# Patient Record
Sex: MALE | Race: WHITE | NOT HISPANIC OR LATINO | Employment: FULL TIME | URBAN - METROPOLITAN AREA
[De-identification: names, ages, dates, MRNs, and addresses within clinical notes are randomized per-mention and may not be internally consistent; named-entity substitution may affect disease eponyms.]

---

## 2017-07-29 ENCOUNTER — APPOINTMENT (EMERGENCY)
Dept: RADIOLOGY | Facility: HOSPITAL | Age: 47
End: 2017-07-29
Payer: COMMERCIAL

## 2017-07-29 ENCOUNTER — HOSPITAL ENCOUNTER (EMERGENCY)
Facility: HOSPITAL | Age: 47
Discharge: HOME | End: 2017-07-29
Attending: EMERGENCY MEDICINE | Admitting: EMERGENCY MEDICINE
Payer: COMMERCIAL

## 2017-07-29 VITALS
WEIGHT: 228 LBS | HEIGHT: 68 IN | RESPIRATION RATE: 18 BRPM | OXYGEN SATURATION: 97 % | DIASTOLIC BLOOD PRESSURE: 74 MMHG | TEMPERATURE: 97.8 F | HEART RATE: 70 BPM | SYSTOLIC BLOOD PRESSURE: 125 MMHG | BODY MASS INDEX: 34.56 KG/M2
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
## 2017-07-29 DIAGNOSIS — S30.810A ABRASION OF LEFT BUTTOCK, INITIAL ENCOUNTER: ICD-10-CM

## 2017-07-29 DIAGNOSIS — S60.519A ABRASION, HAND: ICD-10-CM

## 2017-07-29 PROCEDURE — 73552 X-RAY EXAM OF FEMUR 2/>: CPT

## 2017-07-29 PROCEDURE — 73130 X-RAY EXAM OF HAND: CPT

## 2017-07-29 PROCEDURE — 99284 EMERGENCY DEPT VISIT MOD MDM: CPT

## 2017-07-29 PROCEDURE — 96372 THER/PROPH/DIAG INJ SC/IM: CPT

## 2017-07-29 PROCEDURE — 72170 X-RAY EXAM OF PELVIS: CPT

## 2017-07-29 RX ORDER — KETOROLAC TROMETHAMINE 30 MG/ML
15 INJECTION, SOLUTION INTRAMUSCULAR; INTRAVENOUS ONCE
Status: COMPLETED | OUTPATIENT
Start: 2017-07-29 | End: 2017-07-29

## 2017-07-29 RX ORDER — LISINOPRIL 10 MG/1
TABLET ORAL DAILY
COMMUNITY

## 2017-07-29 RX ADMIN — KETOROLAC TROMETHAMINE 15 MG: 30 INJECTION, SOLUTION INTRAMUSCULAR at 16:32

## 2017-12-14 ENCOUNTER — TRANSCRIBE ORDERS (OUTPATIENT)
Dept: ADMINISTRATIVE | Facility: HOSPITAL | Age: 47
End: 2017-12-14

## 2017-12-14 DIAGNOSIS — S12.501A CLOSED NONDISPLACED FRACTURE OF SIXTH CERVICAL VERTEBRA, UNSPECIFIED FRACTURE MORPHOLOGY, INITIAL ENCOUNTER (HCC): Primary | ICD-10-CM

## 2017-12-22 ENCOUNTER — HOSPITAL ENCOUNTER (OUTPATIENT)
Dept: RADIOLOGY | Facility: HOSPITAL | Age: 47
Discharge: HOME | End: 2017-12-22
Payer: COMMERCIAL

## 2017-12-22 ENCOUNTER — GENERIC CONVERSION - ENCOUNTER (OUTPATIENT)
Dept: OTHER | Facility: OTHER | Age: 47
End: 2017-12-22

## 2017-12-22 DIAGNOSIS — S12.501A CLOSED NONDISPLACED FRACTURE OF SIXTH CERVICAL VERTEBRA, UNSPECIFIED FRACTURE MORPHOLOGY, INITIAL ENCOUNTER (HCC): ICD-10-CM

## 2017-12-22 PROCEDURE — 72141 MRI NECK SPINE W/O DYE: CPT

## 2018-01-30 ENCOUNTER — TRANSCRIBE ORDERS (OUTPATIENT)
Dept: ADMINISTRATIVE | Facility: HOSPITAL | Age: 48
End: 2018-01-30

## 2018-01-30 DIAGNOSIS — S12.501A CLOSED NONDISPLACED FRACTURE OF SIXTH CERVICAL VERTEBRA, UNSPECIFIED FRACTURE MORPHOLOGY, INITIAL ENCOUNTER (HCC): Primary | ICD-10-CM

## 2018-02-06 ENCOUNTER — HOSPITAL ENCOUNTER (OUTPATIENT)
Dept: RADIOLOGY | Facility: HOSPITAL | Age: 48
Discharge: HOME | End: 2018-02-06
Payer: COMMERCIAL

## 2018-02-06 DIAGNOSIS — S12.501A CLOSED NONDISPLACED FRACTURE OF SIXTH CERVICAL VERTEBRA, UNSPECIFIED FRACTURE MORPHOLOGY, INITIAL ENCOUNTER (HCC): ICD-10-CM

## 2018-02-06 PROCEDURE — 72157 MRI CHEST SPINE W/O & W/DYE: CPT

## 2018-02-06 PROCEDURE — A9585 GADOBUTROL INJECTION: HCPCS | Performed by: RADIOLOGY

## 2018-02-06 PROCEDURE — 72142 MRI NECK SPINE W/DYE: CPT

## 2018-02-06 RX ADMIN — GADOBUTROL 10 ML: 604.72 INJECTION INTRAVENOUS at 10:38

## 2018-04-05 LAB
ANION GAP SERPL CALCULATED.3IONS-SCNC: 13.1 MM/L
APTT PPP: 28.2 SEC (ref 24.4–37.6)
BASOPHILS # BLD AUTO: 0.1 X3/UL (ref 0–0.3)
BASOPHILS # BLD AUTO: 0.9 % (ref 0–2)
BILIRUB UR QL STRIP: NEGATIVE
BUN SERPL-MCNC: 24 MG/DL (ref 7–25)
CALCIUM SERPL-MCNC: 9.5 MG/DL (ref 8.6–10.5)
CHLORIDE SERPL-SCNC: 103 MM/L (ref 98–107)
CLARITY UR: CLEAR
CO2 SERPL-SCNC: 29 MM/L (ref 21–31)
COLOR UR: YELLOW
CREAT SERPL-MCNC: 1.54 MG/DL (ref 0.7–1.3)
DEPRECATED RDW RBC AUTO: 13.2 %
EGFR (HISTORICAL): 48 GFR
EGFR AFRICAN AMERICAN (HISTORICAL): 58 GFR
EOSINOPHIL # BLD AUTO: 0.2 X3/UL (ref 0–0.5)
EOSINOPHIL NFR BLD AUTO: 3.4 % (ref 0–5)
GLUCOSE (HISTORICAL): 91 MG/DL (ref 65–99)
GLUCOSE UR STRIP-MCNC: NEGATIVE MG/DL
HCT VFR BLD AUTO: 40.3 % (ref 42–52)
HGB BLD-MCNC: 13.8 G/DL (ref 14–18)
HGB UR QL STRIP.AUTO: NEGATIVE
INR PPP: 0.97 (ref 0.9–1.5)
KETONES UR STRIP-MCNC: NEGATIVE MG/DL
LEUKOCYTE ESTERASE UR QL STRIP: NEGATIVE
LYMPHOCYTES # BLD AUTO: 1.9 X3/UL (ref 1.2–4.2)
LYMPHOCYTES NFR BLD AUTO: 33 % (ref 20.5–51.1)
MCH RBC QN AUTO: 31.4 PG (ref 26–34)
MCHC RBC AUTO-ENTMCNC: 34.2 G/DL (ref 31–37)
MCV RBC AUTO: 91.7 FL (ref 81–99)
MONOCYTES # BLD AUTO: 0.5 X3/UL (ref 0–1)
MONOCYTES NFR BLD AUTO: 9 % (ref 1.7–12)
NEUTROPHILS # BLD AUTO: 3.1 X3/UL (ref 1.4–6.5)
NEUTS SEG NFR BLD AUTO: 53.7 % (ref 42.2–75.2)
NITRITE UR QL STRIP: NEGATIVE
OSMOLALITY, SERUM (HISTORICAL): 285 MOSM (ref 262–291)
PH UR STRIP.AUTO: 6 [PH] (ref 4.5–8)
PLATELET # BLD AUTO: 215 X3/UL (ref 130–400)
PMV BLD AUTO: 7.8 FL
POTASSIUM SERPL-SCNC: 4.1 MM/L (ref 3.5–5.5)
PROT UR STRIP-MCNC: NEGATIVE MG/DL
PROTHROMBIN TIME (HISTORICAL): 11.2 SEC (ref 10.1–12.9)
RBC # BLD AUTO: 4.4 X6/UL (ref 4.3–5.9)
SODIUM SERPL-SCNC: 141 MM/L (ref 134–143)
SP GR UR STRIP.AUTO: >= 1.03 (ref 1–1.03)
UROBILINOGEN UR QL STRIP.AUTO: 0.2 EU/DL (ref 0.2–8)
WBC # BLD AUTO: 5.8 X3/UL (ref 4.8–10.8)

## 2018-06-06 ENCOUNTER — TRANSCRIBE ORDERS (OUTPATIENT)
Dept: ADMINISTRATIVE | Facility: HOSPITAL | Age: 48
End: 2018-06-06

## 2018-06-06 DIAGNOSIS — G96.198 PSEUDOMENINGOCELE: Primary | ICD-10-CM

## 2018-06-19 ENCOUNTER — HOSPITAL ENCOUNTER (OUTPATIENT)
Dept: RADIOLOGY | Facility: HOSPITAL | Age: 48
Discharge: HOME | End: 2018-06-19

## 2018-06-19 DIAGNOSIS — G96.198 PSEUDOMENINGOCELE: ICD-10-CM

## 2018-06-20 ENCOUNTER — HOSPITAL ENCOUNTER (OUTPATIENT)
Dept: RADIOLOGY | Facility: HOSPITAL | Age: 48
Discharge: HOME | End: 2018-06-20
Payer: COMMERCIAL

## 2018-06-20 ENCOUNTER — APPOINTMENT (OUTPATIENT)
Dept: PHYSICAL THERAPY | Facility: CLINIC | Age: 48
End: 2018-06-20
Payer: COMMERCIAL

## 2018-06-20 PROCEDURE — 72157 MRI CHEST SPINE W/O & W/DYE: CPT

## 2018-06-20 PROCEDURE — A9585 GADOBUTROL INJECTION: HCPCS | Performed by: INTERNAL MEDICINE

## 2018-06-20 PROCEDURE — G8985 CARRY GOAL STATUS: HCPCS | Performed by: PHYSICAL THERAPIST

## 2018-06-20 PROCEDURE — G8984 CARRY CURRENT STATUS: HCPCS | Performed by: PHYSICAL THERAPIST

## 2018-06-20 RX ADMIN — GADOBUTROL 10 ML: 604.72 INJECTION INTRAVENOUS at 15:09

## 2018-06-22 ENCOUNTER — APPOINTMENT (OUTPATIENT)
Dept: PHYSICAL THERAPY | Facility: CLINIC | Age: 48
End: 2018-06-22
Payer: COMMERCIAL

## 2018-06-25 NOTE — PROGRESS NOTES
Daily Note     Today's date: 2018  Patient name: Latisha Spear  : 1970  MRN: [id number]  Referring provider: Nelsy Foley MD  Dx:   Encounter Diagnosis     ICD-10-CM    1  Compression of spinal cord with myelopathy (HCC) G95 20    2  Cervical post-laminectomy syndrome M96 1                   Subjective: Te patient reports having increased L sided neck pain after performing the corner pectoralis stretch at home on this past Friday  The patient feels he went too deep into the stretch so he stopped doing it over the weekend  The patient c/o 3-4/10 L sided neck pain described as an "ache" today  Objective: See treatment diary below  Precautions    Specialty Daily Treatment Diary   RE EVAL:     Manual  18        Massage B/L upper trapezius 15mins                                            Exercise Diary  18       UBE stand /70 x 4mins       Nu-step   S=9 L3  X 8 mins       Abdominal Hollow        Bridges hold       Corner Pec stretch hold       Scapular retracts        Chin tucks        Quad DLS          Lower c-spine/upper t-spine stretch  (clasp hands lean fwd) 4x:20  standing       C-spine AROM all(Sit towel roll at L/B) 10x :05 ea       Levator Scapulae stretch B/L 4x :25 ea  AROM       B/L Upper Trap stretch Depress shoulder  4x :25                                                                           Modalities 18       Ultrasound L T-spine paraspinals sit  3mHz, cont, 1 8w/cm2 8 mins                            Patient received new home exercise plan handout with pictures and instructions  Emphasis placed on avoiding pain for all activities/stretches  Assessment: Tolerated treatment well  The patient reports feeling less pain/discomfort at his lower cervical and upper thoracic paraspinal muscles after the session today  The patient needs verbal cues to avoid over stretching and to exhibit proper posture    The patient will benefit from continued PT focused on mobilizing soft tissues around surgical site and strengthening his core muscles  Plan: Continue per plan of care  Will add abdominal hollowing and review new stretches next visit

## 2018-06-26 ENCOUNTER — OFFICE VISIT (OUTPATIENT)
Dept: PHYSICAL THERAPY | Facility: CLINIC | Age: 48
End: 2018-06-26
Payer: COMMERCIAL

## 2018-06-26 DIAGNOSIS — G95.20 COMPRESSION OF SPINAL CORD WITH MYELOPATHY (HCC): Primary | ICD-10-CM

## 2018-06-26 DIAGNOSIS — M96.1 CERVICAL POST-LAMINECTOMY SYNDROME: ICD-10-CM

## 2018-06-26 PROCEDURE — 97140 MANUAL THERAPY 1/> REGIONS: CPT | Performed by: PHYSICAL THERAPIST

## 2018-06-26 PROCEDURE — 97110 THERAPEUTIC EXERCISES: CPT | Performed by: PHYSICAL THERAPIST

## 2018-06-26 PROCEDURE — 97035 APP MDLTY 1+ULTRASOUND EA 15: CPT | Performed by: PHYSICAL THERAPIST

## 2018-06-28 ENCOUNTER — OFFICE VISIT (OUTPATIENT)
Dept: PHYSICAL THERAPY | Facility: CLINIC | Age: 48
End: 2018-06-28
Payer: COMMERCIAL

## 2018-06-28 DIAGNOSIS — M96.1 CERVICAL POST-LAMINECTOMY SYNDROME: ICD-10-CM

## 2018-06-28 DIAGNOSIS — G95.20 COMPRESSION OF SPINAL CORD WITH MYELOPATHY (HCC): Primary | ICD-10-CM

## 2018-06-28 PROCEDURE — 97035 APP MDLTY 1+ULTRASOUND EA 15: CPT | Performed by: PHYSICAL THERAPIST

## 2018-06-28 PROCEDURE — 97140 MANUAL THERAPY 1/> REGIONS: CPT | Performed by: PHYSICAL THERAPIST

## 2018-06-28 PROCEDURE — 97110 THERAPEUTIC EXERCISES: CPT | Performed by: PHYSICAL THERAPIST

## 2018-06-28 NOTE — PROGRESS NOTES
Daily Note     Today's date: 2018  Patient name: Jennifer Newby  : 1970  MRN: [id number]  Referring provider: Jose M Grimm MD  Dx:   Encounter Diagnosis     ICD-10-CM    1  Compression of spinal cord with myelopathy (HCC) G95 20    2  Cervical post-laminectomy syndrome M96 1                   Subjective: Patient complains of B/L neck and davian-scapular pain L>R 4/10 in intensity  The patient was painting 2 days ago which increased his neck pain requiring increased pain medicine  The patient thinks the change in position while painting is what made his pain worse  The patient admits he was not painting a large area - described as a large closet/ small hallway  Objective: See treatment diary below  RE EVAL:     Manual  18      Massage B/L upper trapezius 15mins  15 mins                                          Exercise Diary  18      UBE stand /70 x 4mins 100/70 x 6mins      Nu-step   S=9 L3  X 8 mins L3 x 10 mins      Abdominal Hollow        Bridges hold       Corner Pec stretch hold       Scapular retracts        Chin tucks        Quad DLS          Lower c-spine/upper t-spine stretch  (clasp hands lean fwd) 4x:20  standing 4x:20  Standing      C-spine AROM all(Sit towel roll at L/B) 10x :05 ea 10x 05 ea      Levator Scapulae stretch B/L 4x :25 ea  AROM 4x:25ea  AROM      B/L Upper Trap stretch Depress shoulder  4x :25 Depress shoulder  4x:25                                                                          Modalities 18      Ultrasound L T-spine paraspinals sit  3mHz, cont, 1 8w/cm2 8 mins  12 mins                          Assessment: The patient tolerated his treatment well today - notes feeling less pain after massage and ultrasound  The patient needed some verbal cues to correct posture and perform exercises properly today    The patient will benefit from continued PT to decrease neck/upper back pain and improve UE strength  Plan: Will continue focusing on ROM of C-spine/T-spine and decreasing pain to allow improved activity tolerance

## 2018-07-03 ENCOUNTER — OFFICE VISIT (OUTPATIENT)
Dept: PHYSICAL THERAPY | Facility: CLINIC | Age: 48
End: 2018-07-03
Payer: COMMERCIAL

## 2018-07-03 DIAGNOSIS — M96.1 CERVICAL POST-LAMINECTOMY SYNDROME: ICD-10-CM

## 2018-07-03 DIAGNOSIS — G95.20 COMPRESSION OF SPINAL CORD WITH MYELOPATHY (HCC): Primary | ICD-10-CM

## 2018-07-03 PROCEDURE — 97035 APP MDLTY 1+ULTRASOUND EA 15: CPT | Performed by: PHYSICAL THERAPIST

## 2018-07-03 PROCEDURE — 97140 MANUAL THERAPY 1/> REGIONS: CPT | Performed by: PHYSICAL THERAPIST

## 2018-07-03 PROCEDURE — 97110 THERAPEUTIC EXERCISES: CPT | Performed by: PHYSICAL THERAPIST

## 2018-07-03 NOTE — PROGRESS NOTES
Daily Note     Today's date: 7/3/2018  Patient name: Latisha Spear  : 1970  MRN: [id number]  Referring provider: Nelsy Foley MD  Dx:   Encounter Diagnosis     ICD-10-CM    1  Compression of spinal cord with myelopathy (HCC) G95 20    2  Cervical post-laminectomy syndrome M96 1                   Subjective: Patient states normal pain today at incision, 4/10 pain level  States pain with lifting over shoulder height  More pain in pm with stiffness in am  Most Sx located in trap region with mm tightness which will cause HA  HA approx  1x/wk         Objective: See treatment diary below  RE EVAL:      Manual  6/26/18  6/28/18  7/3/18       Massage B/L upper trapezius 15mins  15 mins  STM/stretch bilat UT, C/S PSM, LS; 1st rib mobs; scar massage; Suboccipital release; C/S manual traction x25min                                                                     Exercise Diary  6/26/18 6/28/18  7/3/18       UBE stand /70 x 4mins 100/70 x 6mins  100/70 x6 min       Nu-step   S=9 L3  X 8 mins L3 x 10 mins  L3 x10 min       Abdominal Hollow             Bridges hold           Corner Pec stretch hold           Scapular retracts             Chin tucks             Quad DLS                Lower c-spine/upper t-spine stretch  (clasp hands lean fwd) 4x:20  standing 4x:20  Standing  4x:20 standing       C-spine AROM all(Sit towel roll at L/B) 10x :05 ea 10x 05 ea  10x:05 each       Levator Scapulae stretch B/L 4x :25 ea  AROM 4x:25ea  AROM  4x:25 AROM       B/L Upper Trap stretch Depress shoulder  4x :25 Depress shoulder  4x:25  Depress shoulder 4x:25       L 1st rib mob with strap     10x                                                                                                                Modalities 6/26/18 6/28/18  7/3/18       Ultrasound L T-spine paraspinals sit  3mHz, cont, 1 8w/cm2 8 mins  12 mins  12 min                                         Assessment: Noted mod limited L 1st rib mobility with UT tightness as well as tightness of suboccipital mm possibly contributing to L superior shoulder pain and tension HA Sx  Patient was given self 1st rib mob for home to help improve rib mobility  Plan: Continue per plan of care

## 2018-07-05 ENCOUNTER — OFFICE VISIT (OUTPATIENT)
Dept: PHYSICAL THERAPY | Facility: CLINIC | Age: 48
End: 2018-07-05
Payer: COMMERCIAL

## 2018-07-05 DIAGNOSIS — M96.1 CERVICAL POST-LAMINECTOMY SYNDROME: ICD-10-CM

## 2018-07-05 DIAGNOSIS — G95.20 COMPRESSION OF SPINAL CORD WITH MYELOPATHY (HCC): Primary | ICD-10-CM

## 2018-07-05 PROCEDURE — 97110 THERAPEUTIC EXERCISES: CPT | Performed by: PHYSICAL THERAPIST

## 2018-07-05 PROCEDURE — 97140 MANUAL THERAPY 1/> REGIONS: CPT | Performed by: PHYSICAL THERAPIST

## 2018-07-05 PROCEDURE — 97035 APP MDLTY 1+ULTRASOUND EA 15: CPT | Performed by: PHYSICAL THERAPIST

## 2018-07-05 NOTE — PROGRESS NOTES
Daily Note     Today's date: 2018  Patient name: Leonid Mon  : 1970  MRN: [id number]  Referring provider: Kailee Castellanos MD  Dx:   Encounter Diagnosis     ICD-10-CM    1  Compression of spinal cord with myelopathy (HCC) G95 20    2  Cervical post-laminectomy syndrome M96 1                   Subjective: The patient complains of 3/10 mid/upper back pain  Patient reports home exercises are " going ok" at this time  Patient notes bending over to  sheets from the floor increased his neck pain, "it felt like a stroke in my neck and throat "  When asked to elaborate the patient reports it was mainly a feeling of pain at this area - it did resolve on it's own  The patient notes this does happen whenever he is bending forward to the floor/reaching        Objective: See treatment diary below  RE EVAL:      Manual  6/26/18  6/28/18  7/3/18  7/5/18     Massage B/L upper trapezius 15mins  15 mins  STM/stretch bilat UT, C/S PSM, LS; 1st rib mobs; scar massage; Suboccipital release; C/S manual traction x25min  STM/scar massage    15 mins                                                                   Exercise Diary  6/26/18 6/28/18  7/3/18  7/5/18     UBE stand /70 x 4mins 100/70 x 6mins  100/70 x6 min  100/70 x6mins     Nu-step   S=9 L3  X 8 mins L3 x 10 mins  L3 x10 min  L4 x10 mins     Abdominal Hollow          *   Bridges hold           Corner Pec stretch hold           Scapular retracts          *   Chin tucks         *    Quad DLS             *   Lower c-spine/upper  t-spine stretch  (clasp hands lean fwd) 4x:20  standing 4x:20  Standing  4x:20 standing  4x:20     C-spine AROM all(Sit towel roll at L/B) 10x :05 ea 10x 05 ea  10x:05 each  10x :05ea  home ex   Levator Scapulae stretch B/L 4x :25 ea  AROM 4x:25ea  AROM  4x:25 AROM  4x :25 over pressure  home ex   B/L Upper Trap stretch Depress shoulder  4x :25 Depress shoulder  4x:25  Depress shoulder 4x:25  4x:25  Arom  home ex   L 1st rib mob with strap     10x                      SCM stretch B/L           *    Scalene Stretch B/L          *                                                                 Modalities 6/26/18 6/28/18  7/3/18  7/5/18     Ultrasound L T-spine paraspinals sit  3mHz, cont, 1 8w/cm2 8 mins  12 mins  12 min  12 min                                     Assessment: The patient had tightness at his left sternocleidomastoid/scalene muscle group during soft tissue massage today  The patient's exercise technique/posture is improving each week  The patient required less cues to exercise properly  The patient will benefit from starting some stabilization exercises next week as long as his pain levels remain below a 5/10 level and he demonstrates no worsening of neurologic symptoms  Patient will benefit from continued skilled PT services at this time  Plan: The patient will benefit from the introduction of postural strengthening/ dynamic stabilization exercises next week

## 2018-07-09 NOTE — PROGRESS NOTES
Daily Note     Today's date: 7/10/18  Patient name: Leonid Mon  : 1970  MRN: [id number]  Referring provider: Kailee Castellanos MD  Dx:   Encounter Diagnosis     ICD-10-CM    1  Compression of spinal cord with myelopathy (HCC) G95 20    2  Cervical post-laminectomy syndrome M96 1                   Subjective: Patient complained of 4/10 pain at upper cervical spine causing a cervical headache  The patient reports having increased L sided neck pain after last session which lasted 18-18  The patient feels the increased time spent massaging at his L SCM/scalene area aggravated his pain  Patient does admit the massage felt good at the time but it was later that night that he experienced increased pain          Objective: See treatment diary below  RE EVAL:      Manual  6/26/18  6/28/18  7/3/18  7/5/18  7/10/18   Massage B/L upper trapezius 15mins  15 mins  STM/stretch bilat UT, C/S PSM, LS; 1st rib mobs; scar massage; Suboccipital release; C/S manual traction x25min  STM/scar massage    15 mins  STM/scar massage   B/L UT/ scapular muscles  10 mins   Gentle manual traction c-spine and sub occipital release          5 mins                                                   Exercise Diary  6/26/18 6/28/18  7/3/18  7/5/18  7/10/18   UBE stand /70 x 4mins 100/70 x 6mins  100/70 x6 min  100/70 x6mins  90/70  X 8mins   Nu-step   S=9 L3  X 8 mins L3 x 10 mins  L3 x10 min  L4 x10 mins  hold   Abdominal Hollow          x 10 stand   Bridges hold           Corner Pec stretch hold           Scapular retracts          * x10 :05   Chin tucks         * x10   Quad DLS                Lower c-spine/upper  t-spine stretch  (clasp hands lean fwd) 4x:20  standing 4x:20  Standing  4x:20 standing  4x:20  4x :20 stand   C-spine AROM all(Sit towel roll at L/B) 10x :05 ea 10x 05 ea  10x:05 each  10x :05ea  home ex   Levator Scapulae stretch B/L 4x :25 ea  AROM 4x:25ea  AROM  4x:25 AROM  4x :25 over pressure  home ex B/L Upper Trap stretch Depress shoulder  4x :25 Depress shoulder  4x:25  Depress shoulder 4x:25  4x:25  Arom  home ex   L 1st rib mob with strap     10x                      SCM stretch B/L           *1x:30 D/C    Scalene Stretch B/L          *3x :30ea                                                                 Modalities 6/26/18 6/28/18  7/3/18  7/5/18  7/10/18   Ultrasound L T-spine paraspinals sit  3mHz, cont, 1 8w/cm2 8 mins  12 mins  12 min  12 min  12 mins                               The patient was given a new home exercise plan with written handout, pictures, and verbal instruction  The patient accepts and understands the new home activities  Assessment: The patient tolerated treatment well today  He reported a decrease in sensitivity around his surgical incision after manual therapy and ultrasound procedures  The patient ended the session feeling good without complaints of pain at his neck  The patient still has difficulty and pain with quick movements, coughing, and jarring steps if stepping off of a curb  The patient will benefit from continued PT to strengthen his core, mobilize his c-spine/t-spine, and allow a return to PLOF  Plan: Will continue to add core strengthening activities within tolerance to pain

## 2018-07-10 ENCOUNTER — OFFICE VISIT (OUTPATIENT)
Dept: PHYSICAL THERAPY | Facility: CLINIC | Age: 48
End: 2018-07-10
Payer: COMMERCIAL

## 2018-07-10 DIAGNOSIS — M96.1 CERVICAL POST-LAMINECTOMY SYNDROME: ICD-10-CM

## 2018-07-10 DIAGNOSIS — G95.20 COMPRESSION OF SPINAL CORD WITH MYELOPATHY (HCC): Primary | ICD-10-CM

## 2018-07-10 PROCEDURE — 97110 THERAPEUTIC EXERCISES: CPT | Performed by: PHYSICAL THERAPIST

## 2018-07-10 PROCEDURE — 97035 APP MDLTY 1+ULTRASOUND EA 15: CPT | Performed by: PHYSICAL THERAPIST

## 2018-07-10 PROCEDURE — 97140 MANUAL THERAPY 1/> REGIONS: CPT | Performed by: PHYSICAL THERAPIST

## 2018-07-12 ENCOUNTER — APPOINTMENT (OUTPATIENT)
Dept: PHYSICAL THERAPY | Facility: CLINIC | Age: 48
End: 2018-07-12
Payer: COMMERCIAL

## 2018-07-12 NOTE — PROGRESS NOTES
Daily Note     Today's date: 2018  Patient name: Janee Gamboa  : 1970  MRN: [id number]  Referring provider: Meredith Macias MD  Dx: No diagnosis found  Subjective: ***      Objective: See treatment diary below  RE EVAL:      Manual       7/10/18   Massage B/L upper trapezius      STM/scar massage   B/L UT/ scapular muscles  10 mins   Gentle manual traction c-spine and sub occipital release      5 mins                                       Exercise Diary       7/10/18   UBE stand ALT      90/70  X 8mins   Nu-step   S=9      hold   Abdominal Hollow      x 10 stand   Bridges         Corner Pec stretch         Scapular retracts      * x10 :05   Chin tucks     * x10   Quad DLS            Lower c-spine/upper  t-spine stretch  (clasp hands lean fwd)      4x :20 stand   C-spine AROM all(Sit towel roll at L/B)      home ex   Levator Scapulae stretch B/L      home ex   B/L Upper Trap stretch      home ex   L 1st rib mob with strap                   SCM stretch B/L       *1x:30 D/C    Scalene Stretch B/L      *3x :30ea                                                 Modalities      7/10/18   Ultrasound L T-spine paraspinals sit  3mHz, cont, 1 8w/cm2      12 mins                           Assessment: Tolerated treatment {Tolerated treatment :}   Patient {assessment:}      Plan: {PLAN:[id number]}

## 2018-07-13 ENCOUNTER — OFFICE VISIT (OUTPATIENT)
Dept: PHYSICAL THERAPY | Facility: CLINIC | Age: 48
End: 2018-07-13
Payer: COMMERCIAL

## 2018-07-13 DIAGNOSIS — M96.1 CERVICAL POST-LAMINECTOMY SYNDROME: ICD-10-CM

## 2018-07-13 DIAGNOSIS — G95.20 COMPRESSION OF SPINAL CORD WITH MYELOPATHY (HCC): Primary | ICD-10-CM

## 2018-07-13 PROCEDURE — 97110 THERAPEUTIC EXERCISES: CPT | Performed by: PHYSICAL THERAPIST

## 2018-07-13 PROCEDURE — 97140 MANUAL THERAPY 1/> REGIONS: CPT | Performed by: PHYSICAL THERAPIST

## 2018-07-13 PROCEDURE — 97035 APP MDLTY 1+ULTRASOUND EA 15: CPT | Performed by: PHYSICAL THERAPIST

## 2018-07-13 NOTE — PROGRESS NOTES
Daily Note     Today's date: 2018  Patient name: Lavanda Dance  : 1970  MRN: [id number]  Referring provider: Tee Becker MD  Dx:   Encounter Diagnosis     ICD-10-CM    1  Compression of spinal cord with myelopathy (HCC) G95 20    2  Cervical post-laminectomy syndrome M96 1        Start Time: 1000          Subjective:  Patient reports feeling pretty good today  "The worst is trying to get out of bed in the morning "  The patient does not have a rating of pain presently        Objective: See treatment diary below  RE EVAL:      Manual  18       Massage B/L upper trapezius STM/Scar massage  x10 mins       Gentle manual traction c-spine and sub occipital release 3 mins                                        Exercise Diary  18       UBE stand ALT 90/70  x8 mins       Nu-step   S=9 L4 [id number]       Abdominal Hollow x10       Bridges        Corner Pec stretch        Scapular retracts x15       Chin tucks x15       Quad DLS           Lower c-spine/upper  t-spine stretch  (clasp hands lean fwd) 4x:20 stand                                                           Scalene Stretch B/L 3x:30 ea                                                 Modalities 18       Ultrasound L T-spine paraspinals sit  3mHz, cont, 1 8w/cm2 X 12 mins                             Assessment: Tolerated treatment well today  The patient did not have any pain after session  The patient needed cues to perform new stretches and exercises properly  The patient will benefit from core strengthening exercises once pain is consistently well controled  Plan: Will re-evaluate next visit and change plan of care as needed

## 2018-07-17 ENCOUNTER — EVALUATION (OUTPATIENT)
Dept: PHYSICAL THERAPY | Facility: CLINIC | Age: 48
End: 2018-07-17
Payer: COMMERCIAL

## 2018-07-17 DIAGNOSIS — G95.20 COMPRESSION OF SPINAL CORD WITH MYELOPATHY (HCC): Primary | ICD-10-CM

## 2018-07-17 DIAGNOSIS — M96.1 CERVICAL POST-LAMINECTOMY SYNDROME: ICD-10-CM

## 2018-07-17 PROCEDURE — 97110 THERAPEUTIC EXERCISES: CPT | Performed by: PHYSICAL THERAPIST

## 2018-07-17 PROCEDURE — G8985 CARRY GOAL STATUS: HCPCS | Performed by: PHYSICAL THERAPIST

## 2018-07-17 PROCEDURE — 97035 APP MDLTY 1+ULTRASOUND EA 15: CPT | Performed by: PHYSICAL THERAPIST

## 2018-07-17 PROCEDURE — G8984 CARRY CURRENT STATUS: HCPCS | Performed by: PHYSICAL THERAPIST

## 2018-07-17 PROCEDURE — 97140 MANUAL THERAPY 1/> REGIONS: CPT | Performed by: PHYSICAL THERAPIST

## 2018-07-17 NOTE — PROGRESS NOTES
PT Re-Evaluation     Today's date: 2018  Patient name: Jacinta Peralta  : 1970  MRN: [id number]  Referring provider: Ambar Bettencourt MD   Dx:   Encounter Diagnosis     ICD-10-CM    1  Compression of spinal cord with myelopathy (HCC) G95 20    2  Cervical post-laminectomy syndrome M96 1                   Assessment  Impairments: abnormal or restricted ROM, abnormal movement, activity intolerance, impaired physical strength, lacks appropriate home exercise program, pain with function, poor posture  and poor body mechanics  Functional limitations: Patient is having difficulty bending forward to tie his shoes, unable to work(swinging hammer, lifting/carrying heavy items), maintaining static standing/sitting positions greater than 30 mins  Assessment details: Jacinta Peralta is a 50 y o  male that presents with complaints of pain and weakness related to 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  The patient has made functional gains since starting therapy  The patient is now able to ride a bicycle on level ground for 30 minutes prior to feeling neck pain  The patient reports sleeping a little better at night with less discomfort  The patient is not as hypersensitive at his L scapular area compared to when he first started therapy  The patient continues to have difficulty with reaching over head, maintaining static standing and sitting positions, forward flexing to tie shoes and bend down, and performing work related activities with heavy lifting and ballistic movements   The patient will benefit from continued skilled PT to address impairments, work towards goals, and restore patient PLOF  Understanding of Dx/Px/POC: good   Prognosis: good    Goals  STG: Achieve in 4 weeks  1  C-spine and upper T-spine @ worst 3-4/10 to improve static sitting/standing tolerance  PARTIALLY ACHIEVED (pain is 5/10 @ worst)  2  C-spine extension ROM: 60 degrees to improve looking over head  PARTIALLY ACHIEVED (now 50 degrees)  3  Strength R handheld dynamometry : 90 pounds;  R hip flexion,Quads, Hams, Tibialis Antetrior > 4/5 to improve standing tolerance  PARTIAL (R MMT is 4/5 to 4-/5)  LTG: Achieve in 6-8 weeks  1  Neck Disability Index:  Less than 30% disability  PARTIAL( now improved to 50% disability)  2  Patient tolerate sitting/standing > 1 hour without pain more than 3/10  PARTIAL (tolerating 30 mins without pain)  3  Patient sleep with less than 2 hours disturbance  PARTIAL ( 2-3 hours sleepless on NDI)  4  Patient to be independent with home exercise plan at time of discharge        Plan  Planned modality interventions: TENS, traction, ultrasound and unattended electrical stimulation  Planned therapy interventions: manual therapy, massage, neuromuscular re-education, patient education, postural training, strengthening, stretching, self care, therapeutic exercise, home exercise program, graded exercise, functional ROM exercises, flexibility and body mechanics training  Frequency: 2x week  Duration in visits: 8  Duration in weeks: 4  Plan of Care beginning date: 2018  Plan of Care expiration date: 2018  Treatment plan discussed with: patient        Subjective Evaluation    History of Present Illness  Date of onset: 2017  Date of surgery: 2018  Mechanism of injury: trauma  Mechanism of injury: Please see previous initial evaluation for mechanism of injury  Quality of life: fair    Pain  Current pain rating: 3  At best pain ratin  At worst pain ratin  Location: left upper trapezius, left lateral c-spine paraspinals  Quality: radiating and pressure  Aggravating factors: overhead activity  Progression: improved      Diagnostic Tests  MRI studies: normal (post op MRI of T-spine revelead no cord compression)  Treatments  Current treatment: physical therapy  Patient Goals  Patient goals for therapy: decreased pain, increased strength, return to sport/leisure activities, return to work and increased motion          Objective     Postural Observations    Additional Postural Observation Details  Patient has forward head posture    Palpation   Left   Tenderness of the sternocleidomastoid and upper trapezius  Left Shoulder Comments  Left upper trapezius: at the base of c-spine  Neurological Testing     Additional Neurological Details  L scapula hypersensitivity to light touch improved compared to initial evaluation  Patient c/o R foot numbness (unchanged)      Active Range of Motion   Cervical/Thoracic Spine   Cervical    Extension: 50 degrees   Left lateral flexion: 40 degrees   Right lateral flexion: 38 degrees     Strength/Myotome Testing     Left Hip   Planes of Motion   Flexion: 4+    Right Hip   Planes of Motion   Flexion: 5    Left Knee   Flexion: 4-  Extension: 4-    Right Knee   Flexion: 5  Extension: 4+    Left Ankle/Foot   Dorsiflexion: 4    Right Ankle/Foot   Dorsiflexion: 5    Additional Strength Details  Hand Held Dynamometry : R: 75 POUNDS  L:  85 POUNDS       Flowsheet Rows      Most Recent Value   PT/OT G-Codes   Current Score  neck disability index : 50% disability   FOTO information reviewed  No   Assessment Type  Re-evaluation   G code set  Carrying, Moving & Handling Objects   Carrying, Moving and Handling Objects Current Status ()  CK   Carrying, Moving and Handling Objects Goal Status ()  CI          Precautions: Avoid extreme end range of motion and lifting > 25 pounds  RE EVAL: 7/18     Manual  7/13/18 7/17/18      Massage B/L upper trapezius STM/Scar massage  x10 mins STM/scar massage  x10 mins      Gentle manual traction c-spine and sub occipital release 3 mins 3 mins                                       Exercise Diary  7/13/18 7/17/18      UBE stand ALT 90/70  x8 mins 90/70  x9 mins      Nu-step   S=9 L4 p21moph L4 x 12 mins      Abdominal Hollow x10 x20      Bridges        Corner Pec stretch  4x:30      Scapular retracts x15 Lt blue x 20       Chin tucks x15 x20      Quad DLS     UE: E14  LE: x10  UE+LE:      Lower c-spine/upper  t-spine stretch  (clasp hands lean fwd) 4x:20 stand 4x:20                                                          Scalene Stretch B/L 3x:30 ea                                                 Modalities 7/13/18 7/17/18      Ultrasound L T-spine paraspinals sit  3mHz, cont, 1 8w/cm2 X 12 mins x12 mins

## 2018-07-17 NOTE — LETTER
2018    Dimple Dial MD  [facility]  [address]    Patient: Tye Lutz   YOB: 1970   Date of Visit: 2018     Encounter Diagnosis     ICD-10-CM    1  Compression of spinal cord with myelopathy (HCC) G95 20    2  Cervical post-laminectomy syndrome M96 1        Dear Dr Maribell Olivia Recipients:    Please review the attached Plan of Care from Sarah Ralph recent visit  Please verify that you agree therapy should continue by signing the attached document and sending it back to our office  If you have any questions or concerns, please don't hesitate to call  Sincerely,    Heraclio Burrows, PT      Referring Provider:      I certify that I have read the below Plan of Care and certify the need for these services furnished under this plan of treatment while under my care  MD Luis Alfredo [facility] [id number]  VIA Mail          PT Re-Evaluation     Today's date: 2018  Patient name: Tye Lutz  : 1970  MRN: [id number]  Referring provider: Anne Moreira MD   Dx:   Encounter Diagnosis     ICD-10-CM    1  Compression of spinal cord with myelopathy (HCC) G95 20    2  Cervical post-laminectomy syndrome M96 1                   Assessment  Impairments: abnormal or restricted ROM, abnormal movement, activity intolerance, impaired physical strength, lacks appropriate home exercise program, pain with function, poor posture  and poor body mechanics  Functional limitations: Patient is having difficulty bending forward to tie his shoes, unable to work(swinging hammer, lifting/carrying heavy items), maintaining static standing/sitting positions greater than 30 mins  Assessment details: Tye Lutz is a 50 y o  male that presents with complaints of pain and weakness related to 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  The patient has made functional gains since starting therapy    The patient is now able to ride a bicycle on level ground for 30 minutes prior to feeling neck pain  The patient reports sleeping a little better at night with less discomfort  The patient is not as hypersensitive at his L scapular area compared to when he first started therapy  The patient continues to have difficulty with reaching over head, maintaining static standing and sitting positions, forward flexing to tie shoes and bend down, and performing work related activities with heavy lifting and ballistic movements   The patient will benefit from continued skilled PT to address impairments, work towards goals, and restore patient PLOF  Understanding of Dx/Px/POC: good   Prognosis: good    Goals  STG: Achieve in 4 weeks  1  C-spine and upper T-spine @ worst 3-4/10 to improve static sitting/standing tolerance  PARTIALLY ACHIEVED (pain is 5/10 @ worst)  2  C-spine extension ROM: 60 degrees to improve looking over head  PARTIALLY ACHIEVED (now 50 degrees)  3  Strength R handheld dynamometry : 90 pounds;  R hip flexion,Quads, Hams, Tibialis Antetrior > 4/5 to improve standing tolerance  PARTIAL (R MMT is 4/5 to 4-/5)  LTG: Achieve in 6-8 weeks  1  Neck Disability Index:  Less than 30% disability  PARTIAL( now improved to 50% disability)  2  Patient tolerate sitting/standing > 1 hour without pain more than 3/10  PARTIAL (tolerating 30 mins without pain)  3  Patient sleep with less than 2 hours disturbance  PARTIAL ( 2-3 hours sleepless on NDI)  4  Patient to be independent with home exercise plan at time of discharge        Plan  Planned modality interventions: TENS, traction, ultrasound and unattended electrical stimulation  Planned therapy interventions: manual therapy, massage, neuromuscular re-education, patient education, postural training, strengthening, stretching, self care, therapeutic exercise, home exercise program, graded exercise, functional ROM exercises, flexibility and body mechanics training  Frequency: 2x week  Duration in visits: 8  Duration in weeks: 4  Plan of Care beginning date: 2018  Plan of Care expiration date: 2018  Treatment plan discussed with: patient        Subjective Evaluation    History of Present Illness  Date of onset: 2017  Date of surgery: 2018  Mechanism of injury: trauma  Mechanism of injury: Please see previous initial evaluation for mechanism of injury  Quality of life: fair    Pain  Current pain rating: 3  At best pain ratin  At worst pain ratin  Location: left upper trapezius, left lateral c-spine paraspinals  Quality: radiating and pressure  Aggravating factors: overhead activity  Progression: improved      Diagnostic Tests  MRI studies: normal (post op MRI of T-spine revelead no cord compression)  Treatments  Current treatment: physical therapy  Patient Goals  Patient goals for therapy: decreased pain, increased strength, return to sport/leisure activities, return to work and increased motion          Objective     Postural Observations    Additional Postural Observation Details  Patient has forward head posture    Palpation   Left   Tenderness of the sternocleidomastoid and upper trapezius  Left Shoulder Comments  Left upper trapezius: at the base of c-spine  Neurological Testing     Additional Neurological Details  L scapula hypersensitivity to light touch improved compared to initial evaluation  Patient c/o R foot numbness (unchanged)      Active Range of Motion   Cervical/Thoracic Spine   Cervical    Extension: 50 degrees   Left lateral flexion: 40 degrees   Right lateral flexion: 38 degrees     Strength/Myotome Testing     Left Hip   Planes of Motion   Flexion: 4+    Right Hip   Planes of Motion   Flexion: 5    Left Knee   Flexion: 4-  Extension: 4-    Right Knee   Flexion: 5  Extension: 4+    Left Ankle/Foot   Dorsiflexion: 4    Right Ankle/Foot   Dorsiflexion: 5    Additional Strength Details  Hand Held Dynamometry : R: 75 POUNDS  L:  85 POUNDS       Flowsheet Rows      Most Recent Value   PT/OT G-Codes   Current Score  neck disability index : 50% disability   FOTO information reviewed  No   Assessment Type  Re-evaluation   G code set  Carrying, Moving & Handling Objects   Carrying, Moving and Handling Objects Current Status ()  CK   Carrying, Moving and Handling Objects Goal Status ()  CI          Precautions: Avoid extreme end range of motion and lifting > 25 pounds  RE EVAL: 7/18     Manual  7/13/18 7/17/18      Massage B/L upper trapezius STM/Scar massage  x10 mins STM/scar massage  x10 mins      Gentle manual traction c-spine and sub occipital release 3 mins 3 mins                                       Exercise Diary  7/13/18 7/17/18      UBE stand ALT 90/70  x8 mins 90/70  x9 mins      Nu-step   S=9 L4 [id number] L4 x 12 mins      Abdominal Hollow x10 x20      Bridges        Corner Pec stretch  4x:30      Scapular retracts x15 Lt blue x 20       Chin tucks x15 x20      Quad DLS     UE: V79  LE: x10  UE+LE:      Lower c-spine/upper  t-spine stretch  (clasp hands lean fwd) 4x:20 stand 4x:20                                                          Scalene Stretch B/L 3x:30 ea                                                 Modalities 7/13/18 7/17/18      Ultrasound L T-spine paraspinals sit  3mHz, cont, 1 8w/cm2 X 12 mins x12 mins

## 2018-07-19 ENCOUNTER — OFFICE VISIT (OUTPATIENT)
Dept: PHYSICAL THERAPY | Facility: CLINIC | Age: 48
End: 2018-07-19
Payer: COMMERCIAL

## 2018-07-19 DIAGNOSIS — G95.20 COMPRESSION OF SPINAL CORD WITH MYELOPATHY (HCC): Primary | ICD-10-CM

## 2018-07-19 DIAGNOSIS — M96.1 CERVICAL POST-LAMINECTOMY SYNDROME: ICD-10-CM

## 2018-07-19 PROCEDURE — 97035 APP MDLTY 1+ULTRASOUND EA 15: CPT | Performed by: PHYSICAL THERAPIST

## 2018-07-19 PROCEDURE — 97110 THERAPEUTIC EXERCISES: CPT | Performed by: PHYSICAL THERAPIST

## 2018-07-19 PROCEDURE — 97140 MANUAL THERAPY 1/> REGIONS: CPT | Performed by: PHYSICAL THERAPIST

## 2018-07-19 NOTE — PROGRESS NOTES
Daily Note     Today's date: 2018  Patient name: Heather Burk  : 1970  MRN: [id number]  Referring provider: Itzel Miramontes MD  Dx:   Encounter Diagnosis     ICD-10-CM    1  Compression of spinal cord with myelopathy (HCC) G95 20    2  Cervical post-laminectomy syndrome M96 1                   Subjective: The patient reports nagging pain 3/10 at B/L upper traps and scapular area  The patient reports it feels like a "sunburn" at times  Objective: See treatment diary below  Precautions: Avoid extreme end range of motion and lifting > 25 pounds  RE EVAL: 18     Manual  18     Massage B/L upper trapezius STM/Scar massage  x10 mins STM/scar massage  x10 mins STM/scar massage  x10 mins     Gentle manual traction c-spine and sub occipital release 3 mins 3 mins 3 mins                                      Exercise Diary  18     UBE stand ALT 90/70  x8 mins 90/70  x9 mins 90/70  x10     Nu-step   S=9 L4 [id number] L4 x 12 mins  Discharge     Abdominal Hollow x10 x20      Bridges   x20     Corner Pec stretch  4x:30 4x:25     Scapular retracts x15 Lt blue x 20  GRN x20     Chin tucks x15 x20      Quad DLS     UE: R44  LE: x10  UE+LE: UE: x15  LE: x15  UE+LE: x15     Lower c-spine/upper  t-spine stretch  (clasp hands lean fwd) 4x:20 stand 4x:20 4x:20             T-band lift/chop in standing B/L   Lt Blue x15 each                                         Scalene Stretch B/L 3x:30 ea  3x :30                                               Modalities 18     Ultrasound L T-spine paraspinals sit  3mHz, cont, 1 8w/cm2 X 12 mins x12 mins x12 mins                           Assessment:  The patient tolerated all activities well today without increase in pain  The patient started some new UE/core strengthening activities today without increasing pain at head/neck  The patient will benefit from continued PT to achieve his functional goals        Plan: Continue per plan of care  Progress treatment as tolerated

## 2018-07-24 ENCOUNTER — OFFICE VISIT (OUTPATIENT)
Dept: PHYSICAL THERAPY | Facility: CLINIC | Age: 48
End: 2018-07-24
Payer: COMMERCIAL

## 2018-07-24 DIAGNOSIS — G95.20 COMPRESSION OF SPINAL CORD WITH MYELOPATHY (HCC): Primary | ICD-10-CM

## 2018-07-24 DIAGNOSIS — M96.1 CERVICAL POST-LAMINECTOMY SYNDROME: ICD-10-CM

## 2018-07-24 PROCEDURE — 97140 MANUAL THERAPY 1/> REGIONS: CPT | Performed by: PHYSICAL THERAPIST

## 2018-07-24 PROCEDURE — 97110 THERAPEUTIC EXERCISES: CPT | Performed by: PHYSICAL THERAPIST

## 2018-07-24 NOTE — PROGRESS NOTES
Daily Note     Today's date: 2018  Patient name: Jason Perry  : 1970  MRN: [id number]  Referring provider: Hazel De La Rosa MD  Dx:   Encounter Diagnosis     ICD-10-CM    1  Compression of spinal cord with myelopathy (HCC) G95 20    2  Cervical post-laminectomy syndrome M96 1                   Subjective: The patient complains of 4/10 pain at B/L buttocks, hamstrings, and calves today from going up/down the steps multiple times over the weekend  The patient is also having pain at his B/L upper trapezius muscles rated a 4/10        Objective: See treatment diary below  Precautions: Avoid extreme end range of motion and lifting > 25 pounds  RE EVAL: 18     Manual  18    Massage B/L upper trapezius STM/Scar massage  x10 mins STM/scar massage  x10 mins STM/scar massage  x10 mins STM  x10min    Gentle manual traction c-spine and sub occipital release 3 mins 3 mins 3 mins x5 mins                                     Exercise Diary  18    UBE stand ALT 90/70  x8 mins 90/70  x9 mins 90/70  x10 80/70  X 10 mins    Nu-step   S=9 L4 [id number] L4 x 12 mins  Discharge     Abdominal Hollow x10 x20  Home ex    Bridges   x20 x20    Corner Pec stretch  4x:30 4x:25 4x:30    Scapular retracts x15 Lt blue x 20  GRN x20 GRN x30    Chin tucks x15 x20  Home ex    Quad DLS     UE: W90  LE: x10  UE+LE: UE: x15  LE: x15  UE+LE: x15 UE+LE  x20 ea    Lower c-spine/upper  t-spine stretch  (clasp hands lean fwd) 4x:20 stand 4x:20 4x:20 4x:20            T-band lift/chop in standing B/L   Lt Blue x15(lift) each  Lt Blue x20  Each(lift)  GRN (chop)            Fitter Board Balance    Fwd/side  x20                        Scalene Stretch B/L 3x:30 ea  3x :30                                               Modalities 7/13/18 7/17/18 7/19/18 7/24/18    Ultrasound L T-spine paraspinals sit  3mHz, cont, 1 8w/cm2 X 12 mins x12 mins x12 mins D/C                       The patient was given a new home exercise plan with written handout, pictures, and verbal instruction  The patient accepts and understands the new home activities  Assessment: Tolerated treatment well and the patient was able to tolerate new balance activities today    Patient demonstrated fatigue post treatment, exhibited good technique with therapeutic exercises and would benefit from continued PT  Ultrasound treatment was discharged this session  Plan: Continue per plan of care  Progress treatment as tolerated

## 2018-07-26 ENCOUNTER — OFFICE VISIT (OUTPATIENT)
Dept: PHYSICAL THERAPY | Facility: CLINIC | Age: 48
End: 2018-07-26
Payer: COMMERCIAL

## 2018-07-26 DIAGNOSIS — G95.20 COMPRESSION OF SPINAL CORD WITH MYELOPATHY (HCC): Primary | ICD-10-CM

## 2018-07-26 DIAGNOSIS — M96.1 CERVICAL POST-LAMINECTOMY SYNDROME: ICD-10-CM

## 2018-07-26 PROCEDURE — 97140 MANUAL THERAPY 1/> REGIONS: CPT | Performed by: PHYSICAL THERAPIST

## 2018-07-26 PROCEDURE — 97110 THERAPEUTIC EXERCISES: CPT | Performed by: PHYSICAL THERAPIST

## 2018-07-26 NOTE — PROGRESS NOTES
Daily Note     Today's date: 2018  Patient name: Amy Argueta  : 1970  MRN: [id number]  Referring provider: Crescencio Bishop MD  Dx:   Encounter Diagnosis     ICD-10-CM    1  Compression of spinal cord with myelopathy (HCC) G95 20    2  Cervical post-laminectomy syndrome M96 1                   Subjective: Patient c/o 3/10 pain at his c-spine and upper trapezius muscles left> right at this time  The patient reports bumping his head yesterday which caused an increase in pain to 5/10 at his posterior head and neck  The patient has been avoiding activities requiring prolonged forward bending ie) bending over to tie his shoes because he finds these activities irritate his pain          Objective: See treatment diary below  Precautions: Avoid extreme end range of motion and lifting > 25 pounds  RE EVAL: 18     Manual  18   Massage B/L upper trapezius STM/Scar massage  x10 mins STM/scar massage  x10 mins STM/scar massage  x10 mins STM  x10min STM  x8 mins   Gentle manual traction c-spine and sub occipital release 3 mins 3 mins 3 mins x5 mins X 7mins                                    Exercise Diary  18   UBE stand ALT 90/70  x8 mins 90/70  x9 mins 90/70  x10 80/70  X 10 mins 80/70  X 10 mins   Nu-step   S=9 L4 [id number] L4 x 12 mins  Discharge     Abdominal Hollow x10 x20  Home ex    Bridges   x20 x20 X 30   Corner Pec stretch  4x:30 4x:25 4x:30 4x:30   Scapular retracts x15 Lt blue x 20  GRN x20 GRN x30 Home ex   Chin tucks x15 x20  Home ex    Quad DLS     UE: W86  LE: x10  UE+LE: UE: x15  LE: x15  UE+LE: x15 UE+LE  x20 ea UE+LE  Alt  x20 each   Lower c-spine/upper  t-spine stretch  (clasp hands lean fwd) 4x:20 stand 4x:20 4x:20 4x:20 4x:20           T-band lift/chop in standing B/L   Lt Blue x15(lift) each  Lt Blue x20  Each(lift)  GRN (chop) Lt blue  x20  Green(chop)  x20           Fitter Board Balance    Fwd/side  x20 Fwd/side  x25    Bosu mini squats          Bosu step ups (r lead)         Scalene Stretch B/L 3x:30 ea  3x :30 nt     Hoist chest press     2 plates  3E90    Hoist seated rows     2 plates  0P69    hoist lat pull downs     2plates  7D58                  Modalities 7/13/18 7/17/18 7/19/18 7/24/18 7/26/18   Ultrasound L T-spine paraspinals sit  3mHz, cont, 1 8w/cm2 X 12 mins x12 mins x12 mins D/C D/C                         Assessment: The patient started the hoist machines today with some complaints of fatigue but no complaints of pain during or immediately after their performance  The patient needed some verbal cues to perform his stretches and exercises properly  The patient reported no increase in pain at the end of session today  The patient will benefit from continued PT to achieve goals  Plan: Continue per plan of care  Progress treatment as tolerated  Will add Bosu step ups next visit    Will assess neck disability index next visit

## 2018-07-31 ENCOUNTER — OFFICE VISIT (OUTPATIENT)
Dept: PHYSICAL THERAPY | Facility: CLINIC | Age: 48
End: 2018-07-31
Payer: COMMERCIAL

## 2018-07-31 DIAGNOSIS — G95.20 COMPRESSION OF SPINAL CORD WITH MYELOPATHY (HCC): Primary | ICD-10-CM

## 2018-07-31 DIAGNOSIS — M96.1 CERVICAL POST-LAMINECTOMY SYNDROME: ICD-10-CM

## 2018-07-31 PROCEDURE — 97110 THERAPEUTIC EXERCISES: CPT | Performed by: PHYSICAL THERAPIST

## 2018-07-31 PROCEDURE — 97140 MANUAL THERAPY 1/> REGIONS: CPT | Performed by: PHYSICAL THERAPIST

## 2018-07-31 NOTE — PROGRESS NOTES
Daily Note     Today's date: 2018  Patient name: Arvin Heller  : 1970  MRN: [id number]  Referring provider: Bg Noriega MD  Dx:   Encounter Diagnosis     ICD-10-CM    1  Compression of spinal cord with myelopathy (HCC) G95 20    2  Cervical post-laminectomy syndrome M96 1        Start Time: 0900  Stop Time: 1000  Total time in clinic (min): 60 minutes    Subjective: Called neurosurgeon Friday due to increase in bilateral LE mm pain and was placed back on gabapentin  Began medicine on  which has helped reduce symptoms  To see PCP at the end of this week for regular f/u, would like reports sent to him  Patient states overall decreased sensitivity in LB, feels looser increased ease with forward bending  Difficulty prolonged sitting/driving for >46 minutes causing Sx to radiate into RLE  PQ today 4/10, "because of the legs"  Continues to have HA a few times a week onset with lifting >25lbs  Objective: See treatment diary below  Precautions: Avoid extreme end range of motion and lifting > 25 pounds  RE EVAL: 18     Manual  18       Massage B/L upper trapezius STM/ Stretch/ active release UT, LS       Gentle manual traction c-spine and sub occipital release performed                                          Exercise Diary  18       UBE stand ALT 80/70 x10min       Bridges x30       Corner Pec stretch doorway 4x:30       Quad DLS    UE+LE alt H23       Lower c-spine/upper  t-spine stretch  (clasp hands lean fwd) 4x:20        T-band lift/chop in standing B/L Lt blue x20/grn x20       Fitter Board Balance Fwd/side  x25        Bosu mini squats          Bosu step ups (r lead)         Scalene Stretch B/L         Hoist chest press 2 plates  3C06        Hoist seated rows 2 plates  2O29        hoist lat pull downs 2 plates  9M69                  NDI= 64% disability, increase since last re-evaluation due to increase in LE symptoms after attempting to wean off medication       Assessment: Patient displays fluctuating symptoms with overactivity, but does display gradual progress towards goals as seen in last re-evaluation  Noted suboccipital tightness L>R possibly contributing to PORTILLO  May benefit from home occipital release tool for symptomatic relief  Plan: Continue per plan of care

## 2018-08-07 ENCOUNTER — OFFICE VISIT (OUTPATIENT)
Dept: PHYSICAL THERAPY | Facility: CLINIC | Age: 48
End: 2018-08-07
Payer: COMMERCIAL

## 2018-08-07 DIAGNOSIS — M96.1 CERVICAL POST-LAMINECTOMY SYNDROME: ICD-10-CM

## 2018-08-07 DIAGNOSIS — G95.20 COMPRESSION OF SPINAL CORD WITH MYELOPATHY (HCC): Primary | ICD-10-CM

## 2018-08-07 PROCEDURE — 97140 MANUAL THERAPY 1/> REGIONS: CPT | Performed by: PHYSICAL THERAPIST

## 2018-08-07 PROCEDURE — 97110 THERAPEUTIC EXERCISES: CPT | Performed by: PHYSICAL THERAPIST

## 2018-08-07 NOTE — PROGRESS NOTES
Daily Note     Today's date: 2018  Patient name: Rafael Amaya  : 1970  MRN: [id number]  Referring provider: Lola Puri MD  Dx:   Encounter Diagnosis     ICD-10-CM    1  Compression of spinal cord with myelopathy (HCC) G95 20    2  Cervical post-laminectomy syndrome M96 1                   Subjective: The patient has complaints of a headache since 18 without specific mechanism of injury/cause noted by the patient  He complains of 3/10 pain at his posterior head/neck area along with his B/L upper trapezius muscles  The patient is still having difficulty going up/down the steps at home because his right leg feels unstable  The patient is biking at the gym for up to 30 minutes max due to right leg muscle fatigue        Objective: See treatment diary below  Precautions: Avoid extreme end range of motion and lifting > 25 pounds  RE EVAL: 18     Manual  18      Massage B/L upper trapezius STM/ Stretch/ active release UT, LS 10 mins      Gentle manual traction c-spine and sub occipital release performed 3x:30 each                                         Exercise Diary  18      UBE stand ALT 80/70 x10min 80/70 x 8 mins      Bridges x30 Home exercises      Corner Pec stretch doorway 4x:30 Doorway 4x:30      Quad DLS    UE+LE alt N63 UE+LE alt x25      Lower c-spine/upper  t-spine stretch  (clasp hands lean fwd) 4x:20  4x :20      T-band lift/chop in standing B/L Lt blue x20/grn x20 Lt blue x20/ grn x20      Fitter Board Balance Fwd/side  x25 Fwd/side  x25       Bosu mini squats          Bosu step ups   B/L x10 ea fwd/back       Scalene Stretch B/L         Hoist chest press 2 plates  3B81 2 plates+#5  3P46       Hoist seated rows 2 plates  9K47 2 plates+#5  1U38       hoist lat pull downs 2 plates  4G15 2 plates+#5  9F53                   Assessment: The patient was able to tolerate the session fair today with some complaints of fatigue and pain at his posterior cervical spine/head  The patient reports his pain at his posterior cervical spine and head was reduced to 2/10 at the end of today's session  The patient reports his pain usually increases to a 5/10 throughout the day because of his activity levels  He notices the more active he is, the higher his pain levels are  The patient will benefit from continued PT focused on LE and core strength  Plan: Continue per plan of care  Progress treatment as tolerated

## 2018-08-09 ENCOUNTER — OFFICE VISIT (OUTPATIENT)
Dept: PHYSICAL THERAPY | Facility: CLINIC | Age: 48
End: 2018-08-09
Payer: COMMERCIAL

## 2018-08-09 DIAGNOSIS — G95.20 COMPRESSION OF SPINAL CORD WITH MYELOPATHY (HCC): Primary | ICD-10-CM

## 2018-08-09 DIAGNOSIS — M96.1 CERVICAL POST-LAMINECTOMY SYNDROME: ICD-10-CM

## 2018-08-09 PROCEDURE — 97140 MANUAL THERAPY 1/> REGIONS: CPT | Performed by: PHYSICAL THERAPIST

## 2018-08-09 PROCEDURE — 97110 THERAPEUTIC EXERCISES: CPT | Performed by: PHYSICAL THERAPIST

## 2018-08-09 NOTE — PROGRESS NOTES
Daily Note     Today's date: 2018  Patient name: Prasanna Dillard  : 1970  MRN: [id number]  Referring provider: Be Greenberg MD  Dx:   Encounter Diagnosis     ICD-10-CM    1  Compression of spinal cord with myelopathy (HCC) G95 20    2  Cervical post-laminectomy syndrome M96 1        Start Time: 906  Stop Time: 1010  Total time in clinic (min): 64 minutes    Subjective: The patient complains of 2 5/10 pain at his B/L c-spine paraspinals and upper trapezius muscles  The patient also reports having this same intensity pain at his incision area  The patient feels this reduction in pain is due to resuming his gabapentin last Wednesday  When asked why the patient stopped the gabapentin he reports running out of the medication was the cause  The patient reports having difficulty bending forward to the floor - feels some pain and gets unsteady with this activity        Objective: See treatment diary below  Precautions: Avoid extreme end range of motion and lifting > 25 pounds  RE EVAL: 18     Manual  18     Massage B/L upper trapezius STM/ Stretch/ active release UT, LS 10 mins 10 mins     Gentle manual traction c-spine and sub occipital release performed 3x:30 each 3x:30 each                                        Exercise Diary  18     UBE stand ALT 80/70 x10min 80/70 x 8 mins 80/70 x 10 mins Elliptical    Bridges x30 Home exercises   ------      Corner Pec stretch doorway 4x:30 Doorway 4x:30  doorway 4x:30     Quad DLS    UE+LE alt A64 UE+LE alt x25      Lower c-spine/upper  t-spine stretch  (clasp hands lean fwd) 4x:20  4x :20 4x:20     T-band lift/chop in standing B/L Lt blue x20/grn x20 Lt blue x20/ grn x20 Lt blue x30/GRNx30     Fitter Board Balance Fwd/side  x25 Fwd/side  x25 Fwd/side  x30      Bosu mini squats          Bosu step ups   B/L x10 ea fwd/back B/L x 15 ea     Standing T-band anti-rotation push/pull         Hoist chest press 2 plates  2Z30 2 plates+#5  9S06 2 plates +#5  6Q67      Hoist seated rows 2 plates  3W54 2 plates+#5  2K27 2 plates+#5  7F95      hoist lat pull downs 2 plates  5P33 2 plates+#5  4I37 2 plates +#5  7U34      Stand ground touches B/L   Alternate x10         Assessment: The patient did well with the new additions to the program today  We added alternating ground touches to improve patient's ability to do this while maintaining balance and proper form  The patient will benefit from continued PT to achieve his goals  Plan: Will continue focusing on UE/LE strength, core strength, balance and stability while utilizing manual therapy to decrease tightness/spasms/pain at the patient's c-spine/thporacic spine

## 2018-08-14 ENCOUNTER — OFFICE VISIT (OUTPATIENT)
Dept: PHYSICAL THERAPY | Facility: CLINIC | Age: 48
End: 2018-08-14
Payer: COMMERCIAL

## 2018-08-14 DIAGNOSIS — G95.20 COMPRESSION OF SPINAL CORD WITH MYELOPATHY (HCC): Primary | ICD-10-CM

## 2018-08-14 DIAGNOSIS — M96.1 CERVICAL POST-LAMINECTOMY SYNDROME: ICD-10-CM

## 2018-08-14 PROCEDURE — 97110 THERAPEUTIC EXERCISES: CPT | Performed by: PHYSICAL THERAPIST

## 2018-08-14 PROCEDURE — 97140 MANUAL THERAPY 1/> REGIONS: CPT | Performed by: PHYSICAL THERAPIST

## 2018-08-14 NOTE — PROGRESS NOTES
Daily Note     Today's date: 2018  Patient name: Christen Hollins  : 1970  MRN: [id number]  Referring provider: Ivonne Sparks MD  Dx:   Encounter Diagnosis     ICD-10-CM    1  Compression of spinal cord with myelopathy (HCC) G95 20    2  Cervical post-laminectomy syndrome M96 1                   Subjective: Patient complains of 2/10 cervical pain this morning  The patient is still having some pain at his upper trapezius muscles L>R  Patient was told by his surgeon's office that he no longer has a specific weight weight restriction for lifting but he should use common sense/discretion to avoid over exertion        Objective: See treatment diary below  Precautions: Progress weights slowly to tolerance(avoid pain/  RE EVAL: 18     Manual  18    Massage B/L upper trapezius STM/ Stretch/ active release UT, LS 10 mins 10 mins 10 mins    Gentle manual traction c-spine and sub occipital release performed 3x:30 each 3x:30 each 3x:30 each                                       Exercise Diary  18    UBE stand ALT 80/70 x10min 80/70 x 8 mins 80/70 x 10 mins Elliptical  L2 x 6mins    Bridges x30 Home exercises   ------  Leg Press  #55 NT *   Corner Pec stretch doorway 4x:30 Doorway 4x:30  doorway 4x:30 4x:30    Quad DLS    UE+LE alt E87 UE+LE alt x25  UE+LE  x30    Lower c-spine/upper  t-spine stretch  (clasp hands lean fwd) 4x:20  4x :20 4x:20 Home ex    T-band lift/chop in standing B/L Lt blue x20/grn x20 Lt blue x20/ grn x20 Lt blue x30/GRNx30  home exercise plan    Fitter Board Balance Fwd/side  x25 Fwd/side  x25 Fwd/side  x30 D/C     Bosu mini squats    x10      Bosu step ups   B/L x10 ea fwd/back B/L x 15 ea B/L x20    Standing T-band anti-rotation push/pull    GRN double band  x10 ea:05     Hoist chest press 2 plates  8S16 2 plates+#5  7C40 2 plates +#5  2I68 3 plates  9B85     Hoist seated rows 2 plates  2T41 2 plates+#5  4I19 2 plates+#5  4M52 3 plates  2Y34     hoist lat pull downs 2 plates  4P55 2 plates+#5  0I39 2 plates +#5  2S15 3 plates  7U18     Stand ground touches B/L   Alternate x10 B/L x10 ea        Assessment: The patient started some new activities today without complaints of increased pain  He reports feeling good after the massage and manual stretching to his neck, upper traps, and scapular muscles  The patient will benefit from continued PT to achieve his goals of returning to prior level of function  Plan: Continue per plan of care  Progress note during next visit  Progress treatment as tolerated

## 2018-08-16 ENCOUNTER — EVALUATION (OUTPATIENT)
Dept: PHYSICAL THERAPY | Facility: CLINIC | Age: 48
End: 2018-08-16
Payer: COMMERCIAL

## 2018-08-16 DIAGNOSIS — M96.1 CERVICAL POST-LAMINECTOMY SYNDROME: ICD-10-CM

## 2018-08-16 DIAGNOSIS — G95.20 COMPRESSION OF SPINAL CORD WITH MYELOPATHY (HCC): Primary | ICD-10-CM

## 2018-08-16 PROCEDURE — G8984 CARRY CURRENT STATUS: HCPCS | Performed by: PHYSICAL THERAPIST

## 2018-08-16 PROCEDURE — 97140 MANUAL THERAPY 1/> REGIONS: CPT | Performed by: PHYSICAL THERAPIST

## 2018-08-16 PROCEDURE — 97110 THERAPEUTIC EXERCISES: CPT | Performed by: PHYSICAL THERAPIST

## 2018-08-16 PROCEDURE — G8985 CARRY GOAL STATUS: HCPCS | Performed by: PHYSICAL THERAPIST

## 2018-08-16 NOTE — PROGRESS NOTES
PT Re-Evaluation     Today's date: 2018  Patient name: Jacinta Peralta  : 1970  MRN: [id number]  Referring provider: Ambar Bettencourt MD   Dx:   Encounter Diagnosis     ICD-10-CM    1  Compression of spinal cord with myelopathy (HCC) G95 20    2  Cervical post-laminectomy syndrome M96 1                   Assessment  Impairments: abnormal movement, activity intolerance, impaired physical strength, pain with function and poor posture   Functional limitations: Patient is having difficulty sleeping at night due to neck pain, frequent headaches, unable to work(swinging hammer, lifting/carrying heavy items), maintaining static standing/sitting positions greater than 30 mins  Assessment details: Jacinta Peralta is a 50 y o  male that presents with complaints of pain and weakness related to 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  The patient has made functional gains since last progress note  The patient is not as hypersensitive at his L scapular area compared to when he first started therapy  The patient is tolerating more strenuous activities during his therapy sessions including a transition to the universal gym  The patient is able to now bend forward to the floor to tie his shoes without experiencing a headache  The patient continues to have difficulty with sleeping longer than 2-3 hours at a time, reaching over head, maintaining static standing and sitting positions, and performing work related activities with heavy lifting and ballistic movements  The patient currently gets headaches 1x/week without a distinct mechanism of injury  The patient will benefit from continued skilled PT to address impairments, work towards goals, and restore patient PLOF  Understanding of Dx/Px/POC: good   Prognosis: good    Goals  STG: Achieve in 4 weeks  1  C-spine and upper T-spine @ worst 3-4/10 to improve static sitting/standing tolerance    PARTIALLY ACHIEVED (pain is 6/10 @ worst) 18  2  C-spine extension ROM: 60 degrees to improve looking over head  PARTIALLY ACHIEVED (now 50 degrees) NOW ACHIEVED 18  3  Strength R handheld dynamometry : 90 pounds;  R hip flexion,Quads, Hams, Tibialis Antetrior > 4/5 to improve standing tolerance  PARTIAL (R MMT is 4/5 to 4-/5) ACHIEVED 18  LTG: Achieve in 6-8 weeks  1  Neck Disability Index:  Less than 30% disability  PARTIAL 18( now improved to 50% disability) NOT ACHIEVED 18(62% disability)  2  Patient tolerate sitting/standing > 1 hour without pain more than 3/10  PARTIAL 18 (tolerating 30 mins without pain) NOT ACHIEVED 18(tolerating 30 mins)  3  Patient sleep with less than 2 hours disturbance  PARTIAL 18 ( 2-3 hours sleepless on NDI) PARTIAL 18  4  Patient to be independent with home exercise plan at time of discharge  NEW GOALS set on 18  ST-4 weeks  1  R Quads, Hams MMT > 4+/5 to allow for squatting without difficulty/ improve static standing  LT weeks  1  Patient go 2 consecutive weeks without a headache  Plan  Planned modality interventions: TENS, traction, ultrasound and unattended electrical stimulation  Planned therapy interventions: manual therapy, massage, neuromuscular re-education, patient education, postural training, strengthening, stretching, therapeutic exercise, home exercise program, graded exercise, functional ROM exercises, flexibility and body mechanics training  Frequency: 2x week  Duration in visits: 8  Duration in weeks: 4  Plan of Care beginning date: 2018  Plan of Care expiration date: 2018  Treatment plan discussed with: patient        Subjective Evaluation    History of Present Illness  Date of onset: 2017  Date of surgery: 2018  Mechanism of injury: trauma  Mechanism of injury: The patient feels his strength is improving steadily with therapy    His pain on a day to day basis is less intense but he does experience breakthrough episodes of neck, arm, and leg pain  The patient is still having difficulty with maintaining static positions longer than 30 mins  The patient has difficulty sleeping longer than 2-3 hours consecutively  The patient does voice his frustration at being unable to perform his recreational activities such as weight lifting, riding motorcycle, and four wheeling  Quality of life: fair    Pain  Current pain ratin  At best pain ratin  At worst pain ratin  Location: left upper trapezius, B/L anterior leg pain  Quality: radiating and pressure  Relieving factors: change in position  Aggravating factors: overhead activity  Progression: improved      Diagnostic Tests  MRI studies: normal (post op MRI of T-spine revelead no cord compression)  Treatments  Current treatment: physical therapy  Patient Goals  Patient goals for therapy: decreased pain, increased strength, return to sport/leisure activities, return to work and increased motion          Objective     Postural Observations    Additional Postural Observation Details  Patient has forward head posture    Palpation   Left   Tenderness of the middle trapezius, sternocleidomastoid and upper trapezius  Left Shoulder Comments  Left upper trapezius: at the base of c-spine  Additional Palpation Details  The patient reports his tenderness is not as severe as 4 weeks ago    Neurological Testing     Sensation   Cervical/Thoracic     Right   Diminished: hot/cold discrimination    Comments   Right hot cold discrimination: R posterior forearm  Hip     Right Hip   Diminished: light touch and hot/cold discrimination    Comments   Right light touch: whole right leg below knee > above knee  Right hot/cold discrimination: Whole right leg below knee> above knee  Additional Neurological Details  L scapula hypersensitivity to light touch improved compared to last re-evaluation  Patient c/o R foot numbness (unchanged)      Active Range of Motion Cervical/Thoracic Spine   Cervical    Flexion: 60 degrees   Extension: 55 degrees   Left lateral flexion: 43 degrees   Right lateral flexion: 45 degrees     Additional Active Range of Motion Details  Improved neck ROM compared to re-evaluation 7/17/18    Strength/Myotome Testing   Cervical Spine     Left   Interossei strength (t1): 4  Neck lateral flexion (C3): 4    Right   Interossei strength (t1): 4etr  Neck lateral flexion (C3): 4    Left Shoulder     Planes of Motion   Flexion: 4   Extension: 5   Abduction: 5     Right Shoulder     Planes of Motion   Flexion: 5   Extension: 5   Abduction: 4     Left Elbow   Flexion: 5  Extension: 5    Right Elbow   Flexion: 5  Extension: 5    Left Hip   Planes of Motion   Flexion: 5    Right Hip   Planes of Motion   Flexion: 5    Left Knee   Flexion: 5  Extension: 5    Right Knee   Flexion: 4  Extension: 4    Left Ankle/Foot   Dorsiflexion: 5    Right Ankle/Foot   Dorsiflexion: 5    Additional Strength Details  Hand Held Dynamometry : R: 90 POUNDS  L:  100 POUNDS (improved by 15 pounds B/L compared to initial evaluation)  Patient's LE strength is improving    Left knee strength now normal   R knee 1 muscle grade away from normal     Left ankle improved strength    Tests     Additional Tests Details  NECK DISABILITY INDEX: 62% disability      Flowsheet Rows      Most Recent Value   PT/OT G-Codes   Current Score  neck disability index: 62% disability( noticeable increase in patient frustration today)   FOTO information reviewed  No   Assessment Type  Re-evaluation   G code set  Carrying, Moving & Handling Objects   Carrying, Moving and Handling Objects Current Status ()  CK   Carrying, Moving and Handling Objects Goal Status ()  CI          Objective: See treatment diary below  Precautions: Progress weights slowly to tolerance(avoid pain)  RE EVAL: 9/17/18     Manual  7/31/18 8/7/18 8/9/18 8/14/18 8/16/18   Massage B/L upper trapezius STM/ Stretch/ active release UT, LS 10 mins 10 mins 10 mins 10 mins   Gentle manual traction c-spine and sub occipital release performed 3x:30 each 3x:30 each 3x:30 each 3x:30 each                                      Exercise Diary  7/31/18 8/7/18 8/9/18 8/14/18 8/16/18   UBE stand ALT 80/70 x10min 80/70 x 8 mins 80/70 x 10 mins Elliptical  L2 x 6mins Elliptical  L2 x7 mins   Bridges x30 Home exercises   ------  Leg Press  #55 NT *--------   Corner Pec stretch doorway 4x:30 Doorway 4x:30  doorway 4x:30 4x:30 3x:30   Quad DLS    UE+LE alt I11 UE+LE alt x25  UE+LE  x30 Pt declined   Lower c-spine/upper  t-spine stretch  (clasp hands lean fwd) 4x:20  4x :20 4x:20 Home ex 2x:30   T-band lift/chop in standing B/L Lt blue x20/grn x20 Lt blue x20/ grn x20 Lt blue x30/GRNx30  home exercise plan -------   Fitter Board Balance Fwd/side  x25 Fwd/side  x25 Fwd/side  x30 D/C -------    Bosu mini squats hands on R knee    x10 2x10     Bosu step ups   B/L x10 ea fwd/back B/L x 15 ea B/L x20 R x20   Standing T-band anti-rotation push/pull    GRN double band  x10 ea:05 Grn double band  x15 each    Hoist chest press 2 plates  0L05 2 plates+#5  1P49 2 plates +#5  6G07 3 plates  1G65 4 plates  0L88    Hoist seated rows 2 plates  9M57 2 plates+#5  1S66 2 plates+#5  3F02 3 plates  3Z65 4 plates  1I15    hoist lat pull downs 2 plates  4G90 2 plates+#5  2C12 2 plates +#5  2E78 3 plates  4Z38 4 plates  1I09   Hoist R knee Ext     2 pl  2x15   Hoist R knee Flex     3 pl  2 x20    Stand ground touches B/L   Alternate x10 B/L x10 ea B/L x10 each

## 2018-08-21 ENCOUNTER — APPOINTMENT (OUTPATIENT)
Dept: PHYSICAL THERAPY | Facility: CLINIC | Age: 48
End: 2018-08-21
Payer: COMMERCIAL

## 2018-08-23 ENCOUNTER — OFFICE VISIT (OUTPATIENT)
Dept: PHYSICAL THERAPY | Facility: CLINIC | Age: 48
End: 2018-08-23
Payer: COMMERCIAL

## 2018-08-23 DIAGNOSIS — G95.20 COMPRESSION OF SPINAL CORD WITH MYELOPATHY (HCC): Primary | ICD-10-CM

## 2018-08-23 DIAGNOSIS — M96.1 CERVICAL POST-LAMINECTOMY SYNDROME: ICD-10-CM

## 2018-08-23 PROCEDURE — 97140 MANUAL THERAPY 1/> REGIONS: CPT | Performed by: PHYSICAL THERAPIST

## 2018-08-23 PROCEDURE — 97110 THERAPEUTIC EXERCISES: CPT | Performed by: PHYSICAL THERAPIST

## 2018-08-23 NOTE — PROGRESS NOTES
Daily Note     Today's date: 2018  Patient name: Kishan Rodríguez  : 1970  MRN: [id number]  Referring provider: Gertrude Jaeger MD  Dx:   Encounter Diagnosis     ICD-10-CM    1  Compression of spinal cord with myelopathy (HCC) G95 20    2  Cervical post-laminectomy syndrome M96 1                   Subjective: Patient reports doing some painting at home over the weekend increased his head and neck pain dramatically  The patient reports a 7/10 pain at it's worst and had difficulty turning his head left  The patient notes rolling the ceiling was the hardest and most painful part  He reports having a Headache starting on 19 and lasting until yesterday 19  The patient saw pain management on 18 and during the examination the patient felt increased pain at his left upper trapezius with palpation from the doctor        Objective: See treatment diary below    Precautions: Progress weights slowly to tolerance(avoid pain)  RE EVAL: 18     Manual  18   Massage B/L upper trapezius 10 mins    10 mins   Gentle manual traction c-spine and sub occipital release 3x:30 each    3x:30 each                                      Exercise Diary  18   Elliptical L2 x 7 mins    Elliptical  L2 x7 mins   Bridges ------    *--------   Corner Pec stretch 4x:30    3x:30   Quad DLS    -----    Pt declined   Lower c-spine/upper  t-spine stretch  (clasp hands lean fwd) 2x:30    2x:30   T-band lift/chop in standing B/L --------    -------   Fitter Board Balance ----------    -------    Bosu mini squats hands on R knee 2x10    2x10     Bosu step ups  R x20    R x20   Standing T-band anti-rotation push/pull Grn double band  x20 each    Grn double band  x15 each    Hoist chest press 5 plates  5C84    4 plates  0A66    Hoist seated rows 5 plates  7H57    4 plates  0T72    hoist lat pull downs 5 plates  3J21    4 plates  8Z13   Hoist R knee Ext 5 plates  9L04    2 pl  2x15 Hoist R knee Flex 5 plates  0E70    3 pl  2 x20    Stand ground touches B/L x10 each    B/L x10 each       Assessment: The patient was able to increase the weights with the hoist universal exercises today  The patient feels his right leg strength and stability is improving - most noticeable during the bosu squats  The patient will benefit from continued outpatient PT to achieve his goals  The patient did experience the right knee buckling twice at the end of the session - the patient feels due to fatigue  He does report feeling better after receiving the massage to his neck/upper back  Plan: Continue per plan of care  Progress treatment as tolerated

## 2018-09-04 ENCOUNTER — OFFICE VISIT (OUTPATIENT)
Dept: PHYSICAL THERAPY | Facility: CLINIC | Age: 48
End: 2018-09-04
Payer: COMMERCIAL

## 2018-09-04 DIAGNOSIS — G95.20 COMPRESSION OF SPINAL CORD WITH MYELOPATHY (HCC): Primary | ICD-10-CM

## 2018-09-04 DIAGNOSIS — M96.1 CERVICAL POST-LAMINECTOMY SYNDROME: ICD-10-CM

## 2018-09-04 PROCEDURE — 97140 MANUAL THERAPY 1/> REGIONS: CPT | Performed by: PHYSICAL THERAPIST

## 2018-09-04 PROCEDURE — 97110 THERAPEUTIC EXERCISES: CPT | Performed by: PHYSICAL THERAPIST

## 2018-09-04 NOTE — PROGRESS NOTES
Daily Note     Today's date: 2018  Patient name: Jay Balderas  : 1970  MRN: [id number]  Referring provider: Elke Lozano MD  Dx:   Encounter Diagnosis     ICD-10-CM    1  Compression of spinal cord with myelopathy (HCC) G95 20    2  Cervical post-laminectomy syndrome M96 1                   Subjective: The patient reports having 3/10 pain on both sides of his incision at his thoracic spine and at his left upper trapezius  The patient feels this pain is from doing chores around his house over the weekend  The patient reports this pain is consistent and despite feeling good after therapy - this only lasts for 1 hour and then his normal pain returns        Objective: See treatment diary below  Precautions: Progress weights slowly to tolerance(avoid pain)  RE EVAL: 18     Manual  18      Massage B/L upper trapezius 10 mins 10 min      Gentle manual traction c-spine and sub occipital release 3x:30 each 3x:30 each  UT stretch/ Traction                                       Exercise Diary  18      Elliptical L2 x 7 mins L3 x 8mins      Hi planks  Low planks ------ 2x :30  ------      Corner Pec stretch 4x:30 4x:30      Quad DLS    ----- UE/LE        Lower c-spine/upper  t-spine stretch  (clasp hands lean fwd) 2x:30 2x:30      T-band lift/chop in standing B/L -------- Chop: purple  x20 each  Lift: GRN  x20 each      Fitter Board Balance ----------        Bosu mini squats hands on R knee 2x10 x15 with ball toss/catch        Bosu step ups  R x20 Single leg step ups with pause SLS  B/L x10      Standing T-band anti-rotation push/pull Grn double band  x20 each GRN Band double  x15 each       Hoist chest press 5 plates  1E85 5 plates  R49       Hoist seated rows 5 plates  1P73 5 plates  4I25       hoist lat pull downs 5 plates  1P99 5 plates  1H87      Hoist R knee Ext 5 plates  9Z10 -----------      Hoist R knee Flex 5 plates  1N01 ----------       Stand ground touches B/L x10 each X 10 each          Assessment: The patient had difficulty with balancing with right leg on the Bosu ball today  The patient notes being more " clumsy" over the past week  The patient was advised to take note of any acute changes in balance and to make his surgeon aware if this continues to worsen  The patient complained of feeling some right wrist instability during the plank exercise today  The patient had some muscle spasms at his right periscapular area during soft tissue massage  The patient was able to tolerate all other activities fair with some complaints of fatigue  Patient will benefit from continued therapy to strengthen core and allow a return to normal activity levels    Plan: Progress treatment as tolerated  Will continue to focus on core/balance based activities

## 2018-09-06 ENCOUNTER — APPOINTMENT (OUTPATIENT)
Dept: PHYSICAL THERAPY | Facility: CLINIC | Age: 48
End: 2018-09-06
Payer: COMMERCIAL

## 2018-09-11 ENCOUNTER — OFFICE VISIT (OUTPATIENT)
Dept: PHYSICAL THERAPY | Facility: CLINIC | Age: 48
End: 2018-09-11
Payer: COMMERCIAL

## 2018-09-11 DIAGNOSIS — G95.20 COMPRESSION OF SPINAL CORD WITH MYELOPATHY (HCC): Primary | ICD-10-CM

## 2018-09-11 DIAGNOSIS — M96.1 CERVICAL POST-LAMINECTOMY SYNDROME: ICD-10-CM

## 2018-09-11 PROCEDURE — 97110 THERAPEUTIC EXERCISES: CPT | Performed by: PHYSICAL THERAPIST

## 2018-09-11 PROCEDURE — 97140 MANUAL THERAPY 1/> REGIONS: CPT | Performed by: PHYSICAL THERAPIST

## 2018-09-11 NOTE — PROGRESS NOTES
Daily Note     Today's date: 2018  Patient name: Mecca Harvey  : 1970  MRN: [id number]  Referring provider: Sunshine Lindsey MD  Dx:   Encounter Diagnosis     ICD-10-CM    1  Compression of spinal cord with myelopathy (HCC) G95 20    2  Cervical post-laminectomy syndrome M96 1                   Subjective: The patient reports he missed last treatment due to having pneumonia last week  The patient has been treated for this and is feeling better  The patient had a lot of pain at his back from all of the coughing this past week  The patient reports 3/10 pain at his B/L periscapular area and around his incision at his mid-back  The patient feels his right plantar foot has been bothering him more at night with skin crawls and burning  The patient is still taking gabapentin as directed        Objective: See treatment diary below  Precautions: Progress weights slowly to tolerance(avoid pain)  RE EVAL: 18     Manual  18     Massage B/L upper trapezius 10 mins 10 min 10 mins     Gentle manual traction c-spine and sub occipital release 3x:30 each 3x:30 each  UT stretch/ Traction 3x:30 each  UT stretch/Traction                                      Exercise Diary  18     UBE 90/70 standing/   8 mins     Elliptical L2 x 7 mins L3 x 8mins -------     Hi planks  Low planks ------ 2x :30  ------ 2x:30     Corner Pec stretch 4x:30 4x:30 4x:30     Quad DLS    ----- UE/LE   -------     Lower c-spine/upper  t-spine stretch  (clasp hands lean fwd) 2x:30 2x:30 2x:30     T-band lift/chop in standing B/L -------- Chop: purple  x20 each  Lift: GRN  x20 each Chop: double purple x20 ea  Lift: GRN single     Fitter Board Balance ----------  D/C      Bosu mini squats hands on R knee 2x10 x15 with ball toss/catch x15 with ball toss/catch       Bosu step ups  R x20 Single leg step ups with pause SLS  B/L x10 Single leg step ups with pause SLS  B/L x15     Standing T-band anti-rotation push/pull and rotate 11am-1pm Grn double band  x20 each GRN Band double  x15 each GRN Band Double  x15 ea      Hoist chest press 5 plates  0V29 5 plates  Z53 5 plates  9K37      Hoist seated rows 5 plates  6S79 5 plates  6G77 5 plates  8W89      hoist lat pull downs 5 plates  4F11 5 plates  7Z68 5 plates  7J41     Hoist R knee Ext 5 plates  6Q10 ----------- 5 plates  8F41     Hoist R knee Flex 5 plates  3B86 ---------- 5 plates  8R95      Stand ground touches B/L x10 each X 10 each x10 ea         Assessment: The patient complained of fatigue at his right leg along with burning/ache at his B/L upper T-spine paraspinals during today's session  The patient had difficulty with some of the balance activities including the floor touch/pendulum exercises  The patient will benefit from continued PT to improve his balance, strength, and reduce pain  Plan: Will continue working core strength and high repetitions to promote endurance/stamina for daily activities

## 2018-09-13 ENCOUNTER — EVALUATION (OUTPATIENT)
Dept: PHYSICAL THERAPY | Facility: CLINIC | Age: 48
End: 2018-09-13
Payer: COMMERCIAL

## 2018-09-13 DIAGNOSIS — M96.1 CERVICAL POST-LAMINECTOMY SYNDROME: ICD-10-CM

## 2018-09-13 DIAGNOSIS — G95.20 COMPRESSION OF SPINAL CORD WITH MYELOPATHY (HCC): Primary | ICD-10-CM

## 2018-09-13 PROCEDURE — G8984 CARRY CURRENT STATUS: HCPCS | Performed by: PHYSICAL THERAPIST

## 2018-09-13 PROCEDURE — 97110 THERAPEUTIC EXERCISES: CPT | Performed by: PHYSICAL THERAPIST

## 2018-09-13 PROCEDURE — G8985 CARRY GOAL STATUS: HCPCS | Performed by: PHYSICAL THERAPIST

## 2018-09-13 PROCEDURE — 97140 MANUAL THERAPY 1/> REGIONS: CPT | Performed by: PHYSICAL THERAPIST

## 2018-09-13 NOTE — PROGRESS NOTES
PT Re-Evaluation  and PT Discharge    Today's date: 2018  Patient name: Jennifer Newby  : 1970  MRN: [id number]  Referring provider: Dandy Joseph MD   Dx:   Encounter Diagnosis     ICD-10-CM    1  Compression of spinal cord with myelopathy (HCC) G95 20    2  Cervical post-laminectomy syndrome M96 1                   Assessment  Impairments: abnormal movement, activity intolerance, impaired physical strength, pain with function and poor posture   Functional limitations: Patient is having difficulty sleeping at night due to neck pain, frequent headaches, unable to work(swinging hammer, lifting/carrying heavy items), maintaining static standing/sitting positions greater than 30 mins  Assessment details: Jennifer Newby is a 50 y o  male that presents with complaints of pain, weakness, and imbalance related to 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  The patient has limited functional gains since last progress note 4 weeks ago  The patient is tolerating more strenuous activities during his therapy sessions including progression with the universal gym, core strengthening, and balance activities  The patient continues to have difficulty with sleeping longer than 2-3 hours at a time, reaching over head, maintaining static standing and sitting positions, and performing work related activities with heavy lifting and ballistic movements  The patient has not experienced a headache in the past 2 weeks which is improved compared to last re-assessment  The patient is concerned about saving some therapy visits/benefit for the remainder of the year in case he is injured at a different body part or if he experiences a significant worsening of function related to his current diagnosis  The patient is independent with his home exercise plan at this time and plans on continuing this process at home and at the local gym    The patient will be discharged from formal therapy to preserve some visits in case the patient would need to use later in the year  Understanding of Dx/Px/POC: good  Goals  STG: Achieve in 4 weeks  1  C-spine and upper T-spine @ worst 3-4/10 to improve static sitting/standing tolerance  PARTIALLY ACHIEVED (pain is 6/10 @ worst) 18  NOT ACHIEVED 18  2  C-spine extension ROM: 60 degrees to improve looking over head  PARTIALLY ACHIEVED (now 50 degrees)18  NOW ACHIEVED 18   3  Strength R handheld dynamometry : 90 pounds;  R hip flexion,Quads, Hams, Tibialis Antetrior > 4/5 to improve standing tolerance  PARTIAL (R MMT is 4/5 to 4-/5) ACHIEVED 18  LTG: Achieve in 6-8 weeks  1  Neck Disability Index:  Less than 30% disability  PARTIAL 18( now improved to 50% disability) NOT ACHIEVED 18(62% disability)  NOT ACHIEVED 18  (52% disability)  2  Patient tolerate sitting/standing > 1 hour without pain more than 3/10  PARTIAL 18 (tolerating 30 mins without pain) NOT ACHIEVED 18(tolerating 30 mins)  NOT ACHIEVED 18( 30 minute limit)  3  Patient sleep with less than 2 hours disturbance  PARTIAL 18 ( 2-3 hours sleepless on NDI) PARTIAL 18  NOT ACHIEVED 18(3-5 hours sleeplessness)  4  Patient to be independent with home exercise plan at time of discharge  ACHIEVED 18    NEW GOALS set on 18  ST-4 weeks  1  R Quads, Hams MMT > 4+/5 to allow for squatting without difficulty/ improve static standing  NOT ACHIEVED 18  LT weeks  1  Patient go 2 consecutive weeks without a headache  ACHIEVED 18           Plan  Treatment plan discussed with: patient  Plan details: Discharge formal PT to independent home exercise plan per patient request to preserve therapy visits in case they are needed for another injury/ a worsening in function/pain for this diagnosis          Subjective Evaluation    History of Present Illness  Date of onset: 2017  Date of surgery: 2018  Mechanism of injury: trauma  Mechanism of injury: The patient feels his strength is improving steadily with therapy  His pain on a day to day basis is less intense but he does experience breakthrough episodes of neck, arm, back and leg pain  The patient complains of experiencing " zings" of sharp/intense pain across his lower C-spine and upper T-spine which lead to headaches if happening multiple times  The patient reports these "zings" happen when lifting objects or raising his arms over head  The patient is still having difficulty with maintaining static positions longer than 30 mins  The patient has difficulty sleeping longer than 2-3 hours consecutively  UPDATE: 18  The above information is still valid 4 weeks later  Quality of life: fair    Pain  Current pain rating: 3  At best pain ratin  At worst pain ratin  Location: left upper trapezius, Right foot pain, B/L thoracic paraspinals/middle traps, left c-spine paraspinals  Quality: radiating and pressure  Relieving factors: change in position  Aggravating factors: overhead activity, standing, sitting and lifting  Progression: improved      Diagnostic Tests  MRI studies: normal (post op MRI of T-spine revelead no cord compression)  Treatments  Current treatment: physical therapy  Patient Goals  Patient goals for therapy: decreased pain, increased strength, return to sport/leisure activities, return to work and increased motion          Objective     Postural Observations    Additional Postural Observation Details  Patient has forward head posture    Palpation   Left   Tenderness of the middle trapezius, sternocleidomastoid and upper trapezius  Right Tenderness of the middle trapezius  Left Shoulder Comments  Left upper trapezius: at the base of c-spine  Neurological Testing     Sensation   Cervical/Thoracic     Right   Diminished: hot/cold discrimination    Comments   Right hot cold discrimination: R posterior forearm       Hip     Right Hip Diminished: light touch and hot/cold discrimination    Comments   Right light touch: whole right leg below knee > above knee  Right hot/cold discrimination: Whole right leg below knee> above knee  Additional Neurological Details  The patient complains of right foot numbness along with diminished hot/cold discrimination starting at right mid-shin down to his foot  Upper extremity intact to light touch and hot/cold discrimination  9/13/18   Patient c/o R foot numbness (unchanged)  Strength/Myotome Testing   Cervical Spine     Left   Interossei strength (t1): 5  Neck lateral flexion (C3): 5    Right   Interossei strength (t1): 5etr  Neck lateral flexion (C3): 5    Left Shoulder     Planes of Motion   Flexion: 4   Extension: 5   Abduction: 5     Right Shoulder     Planes of Motion   Flexion: 5   Extension: 5   Abduction: 4     Left Elbow   Flexion: 5  Extension: 5    Right Elbow   Flexion: 5  Extension: 5    Left Hip   Planes of Motion   Flexion: 5    Right Hip   Planes of Motion   Flexion: 5    Left Knee   Flexion: 5  Extension: 5    Right Knee   Flexion: 4  Extension: 4    Left Ankle/Foot   Dorsiflexion: 5    Right Ankle/Foot   Dorsiflexion: 5    Additional Strength Details  Patient's right knee flexion and extension strength did not improve compared to 4 weeks ago        Tests     Additional Tests Details  NECK DISABILITY INDEX: 52% disability (improved by 10% 9/13/18)      Flowsheet Rows      Most Recent Value   PT/OT G-Codes   Current Score  nek disability index: 52% disability   FOTO information reviewed  No   Assessment Type  Discharge   G code set  Carrying, Moving & Handling Objects   Carrying, Moving and Handling Objects Current Status ()  CK   Carrying, Moving and Handling Objects Goal Status ()  CI          Objective: See treatment diary below  Precautions: Progress weights slowly to tolerance(avoid pain)  RE EVAL: 9/17/18     Manual  8/23/18 9/4/18 9/11/18 9/13/18    Massage B/L upper trapezius 10 mins 10 min 10 mins 10 mins    Gentle manual traction c-spine and sub occipital release 3x:30 each 3x:30 each  UT stretch/ Traction 3x:30 each  UT stretch/Traction 3x:30 each  UT/Traction                                     Exercise Diary  8/23/18 9/4/18 9/11/18 9/13/18    UBE 90/70 standing/   8 mins --------    Elliptical L2 x 7 mins L3 x 8mins ------- L4 x10 mins    Hi planks  Low planks ------ 2x :30  ------ 2x:30 2x:30    Corner Pec stretch 4x:30 4x:30 4x:30 4x:30    Quad DLS    ----- UE/LE   ------- ----    Lower c-spine/upper  t-spine stretch  (clasp hands lean fwd) 2x:30 2x:30 2x:30 --------    T-band lift/chop in standing B/L -------- Chop: purple  x20 each  Lift: GRN  x20 each Chop: double purple x20 ea  Lift: GRN single x20 Chop: double x20 each  Lift: Grn single x20    Fitter Board Balance ----------  D/C ----     Bosu mini squats hands on R knee 2x10 x15 with ball toss/catch x15 with ball toss/catch x15 with ball toss      Bosu step ups  R x20 Single leg step ups with pause SLS  B/L x10 Single leg step ups with pause SLS  B/L x15 Single pause  B/L x15    Standing T-band anti-rotation push/pull and rotate 11am-1pm Grn double band  x20 each GRN Band double  x15 each GRN Band Double  x15 ea GRN double  x15 each     Hoist chest press 5 plates  1A20 5 plates  A81 5 plates  7A66 5 plates  8P69     Hoist seated rows 5 plates  3Q05 5 plates  7F55 5 plates  8G99 5 plates  1J43     hoist lat pull downs 5 plates  6F64 5 plates  8O83 5 plates  7F40 5 plates  5N03    Hoist R knee Ext 5 plates  7Y83 ----------- 5 plates  2G72 5 plates  9Q66    Hoist R knee Flex 5 plates  3O33 ---------- 5 plates  3Z70 5 pates  2x15     Stand ground touches B/L x10 each X 10 each x10 ea x10 ea

## 2018-09-13 NOTE — LETTER
2018    Reji Martinez MD  \[facility]\"" [address]    Patient: America Pinto   YOB: 1970   Date of Visit: 2018     Encounter Diagnosis     ICD-10-CM    1  Compression of spinal cord with myelopathy (HCC) G95 20    2  Cervical post-laminectomy syndrome M96 1        Dear Dr Keegan Jennings:    Please review the attached Plan of Care from [facility] recent visit  Please verify that you agree therapy should continue by signing the attached document and sending it back to our office  If you have any questions or concerns, please don't hesitate to call  Sincerely,    Justin Walker, PT      Referring Provider:      I certify that I have read the below Plan of Care and certify the need for these services furnished under this plan of treatment while under my care  Reji Martinez MD  [facility] [id number]  VIA Mail          PT Re-Evaluation  and PT Discharge    Today's date: 2018  Patient name: America Pinto  : 1970  MRN: [id number]  Referring provider: Sheldon Nunez MD   Dx:   Encounter Diagnosis     ICD-10-CM    1  Compression of spinal cord with myelopathy (HCC) G95 20    2  Cervical post-laminectomy syndrome M96 1                   Assessment  Impairments: abnormal movement, activity intolerance, impaired physical strength, pain with function and poor posture   Functional limitations: Patient is having difficulty sleeping at night due to neck pain, frequent headaches, unable to work(swinging hammer, lifting/carrying heavy items), maintaining static standing/sitting positions greater than 30 mins  Assessment details: America Pinto is a 50 y o  male that presents with complaints of pain, weakness, and imbalance related to 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  The patient has limited functional gains since last progress note 4 weeks ago    The patient is tolerating more strenuous activities during his therapy sessions including progression with the universal gym, core strengthening, and balance activities  The patient continues to have difficulty with sleeping longer than 2-3 hours at a time, reaching over head, maintaining static standing and sitting positions, and performing work related activities with heavy lifting and ballistic movements  The patient has not experienced a headache in the past 2 weeks which is improved compared to last re-assessment  The patient is concerned about saving some therapy visits/benefit for the remainder of the year in case he is injured at a different body part or if he experiences a significant worsening of function related to his current diagnosis  The patient is independent with his home exercise plan at this time and plans on continuing this process at home and at the local gym  The patient will be discharged from formal therapy to preserve some visits in case the patient would need to use later in the year  Understanding of Dx/Px/POC: good  Goals  STG: Achieve in 4 weeks  1  C-spine and upper T-spine @ worst 3-4/10 to improve static sitting/standing tolerance  PARTIALLY ACHIEVED (pain is 6/10 @ worst) 8/16/18  NOT ACHIEVED 9/13/18  2  C-spine extension ROM: 60 degrees to improve looking over head  PARTIALLY ACHIEVED (now 50 degrees)7/17/18  NOW ACHIEVED 8/16/18   3  Strength R handheld dynamometry : 90 pounds;  R hip flexion,Quads, Hams, Tibialis Antetrior > 4/5 to improve standing tolerance  PARTIAL (R MMT is 4/5 to 4-/5) ACHIEVED 8/16/18  LTG: Achieve in 6-8 weeks  1  Neck Disability Index:  Less than 30% disability  PARTIAL 7/17/18( now improved to 50% disability) NOT ACHIEVED 8/16/18(62% disability)  NOT ACHIEVED 9/13/18  (52% disability)  2  Patient tolerate sitting/standing > 1 hour without pain more than 3/10    PARTIAL 7/17/18 (tolerating 30 mins without pain) NOT ACHIEVED 8/16/18(tolerating 30 mins)  NOT ACHIEVED 18( 30 minute limit)  3  Patient sleep with less than 2 hours disturbance  PARTIAL 18 ( 2-3 hours sleepless on NDI) PARTIAL 18  NOT ACHIEVED 18(3-5 hours sleeplessness)  4  Patient to be independent with home exercise plan at time of discharge  ACHIEVED 18    NEW GOALS set on 18  ST-4 weeks  1  R Quads, Hams MMT > 4+/5 to allow for squatting without difficulty/ improve static standing  NOT ACHIEVED 18  LT weeks  1  Patient go 2 consecutive weeks without a headache  ACHIEVED 18           Plan  Treatment plan discussed with: patient  Plan details: Discharge formal PT to independent home exercise plan per patient request to preserve therapy visits in case they are needed for another injury/ a worsening in function/pain for this diagnosis  Subjective Evaluation    History of Present Illness  Date of onset: 2017  Date of surgery: 2018  Mechanism of injury: trauma  Mechanism of injury: The patient feels his strength is improving steadily with therapy  His pain on a day to day basis is less intense but he does experience breakthrough episodes of neck, arm, back and leg pain  The patient complains of experiencing " zings" of sharp/intense pain across his lower C-spine and upper T-spine which lead to headaches if happening multiple times  The patient reports these "zings" happen when lifting objects or raising his arms over head  The patient is still having difficulty with maintaining static positions longer than 30 mins  The patient has difficulty sleeping longer than 2-3 hours consecutively  UPDATE: 18  The above information is still valid 4 weeks later    Quality of life: fair    Pain  Current pain rating: 3  At best pain ratin  At worst pain ratin  Location: left upper trapezius, Right foot pain, B/L thoracic paraspinals/middle traps, left c-spine paraspinals  Quality: radiating and pressure  Relieving factors: change in position  Aggravating factors: overhead activity, standing, sitting and lifting  Progression: improved      Diagnostic Tests  MRI studies: normal (post op MRI of T-spine revelead no cord compression)  Treatments  Current treatment: physical therapy  Patient Goals  Patient goals for therapy: decreased pain, increased strength, return to sport/leisure activities, return to work and increased motion          Objective     Postural Observations    Additional Postural Observation Details  Patient has forward head posture    Palpation   Left   Tenderness of the middle trapezius, sternocleidomastoid and upper trapezius  Right Tenderness of the middle trapezius  Left Shoulder Comments  Left upper trapezius: at the base of c-spine  Neurological Testing     Sensation   Cervical/Thoracic     Right   Diminished: hot/cold discrimination    Comments   Right hot cold discrimination: R posterior forearm  Hip     Right Hip   Diminished: light touch and hot/cold discrimination    Comments   Right light touch: whole right leg below knee > above knee  Right hot/cold discrimination: Whole right leg below knee> above knee  Additional Neurological Details  The patient complains of right foot numbness along with diminished hot/cold discrimination starting at right mid-shin down to his foot  Upper extremity intact to light touch and hot/cold discrimination  9/13/18   Patient c/o R foot numbness (unchanged)      Strength/Myotome Testing   Cervical Spine     Left   Interossei strength (t1): 5  Neck lateral flexion (C3): 5    Right   Interossei strength (t1): 5etr  Neck lateral flexion (C3): 5    Left Shoulder     Planes of Motion   Flexion: 4   Extension: 5   Abduction: 5     Right Shoulder     Planes of Motion   Flexion: 5   Extension: 5   Abduction: 4     Left Elbow   Flexion: 5  Extension: 5    Right Elbow   Flexion: 5  Extension: 5    Left Hip   Planes of Motion   Flexion: 5    Right Hip   Planes of Motion   Flexion: 5    Left Knee   Flexion: 5  Extension: 5    Right Knee   Flexion: 4  Extension: 4    Left Ankle/Foot   Dorsiflexion: 5    Right Ankle/Foot   Dorsiflexion: 5    Additional Strength Details  Patient's right knee flexion and extension strength did not improve compared to 4 weeks ago        Tests     Additional Tests Details  NECK DISABILITY INDEX: 52% disability (improved by 10% 9/13/18)      Flowsheet Rows      Most Recent Value   PT/OT G-Codes   Current Score  nek disability index: 52% disability   FOTO information reviewed  No   Assessment Type  Discharge   G code set  Carrying, Moving & Handling Objects   Carrying, Moving and Handling Objects Current Status ()  CK   Carrying, Moving and Handling Objects Goal Status ()  CI          Objective: See treatment diary below  Precautions: Progress weights slowly to tolerance(avoid pain)  RE EVAL: 9/17/18     Manual  8/23/18 9/4/18 9/11/18 9/13/18    Massage B/L upper trapezius 10 mins 10 min 10 mins 10 mins    Gentle manual traction c-spine and sub occipital release 3x:30 each 3x:30 each  UT stretch/ Traction 3x:30 each  UT stretch/Traction 3x:30 each  UT/Traction                                     Exercise Diary  8/23/18 9/4/18 9/11/18 9/13/18    UBE 90/70 standing/   8 mins --------    Elliptical L2 x 7 mins L3 x 8mins ------- L4 x10 mins    Hi planks  Low planks ------ 2x :30  ------ 2x:30 2x:30    Corner Pec stretch 4x:30 4x:30 4x:30 4x:30    Quad DLS    ----- UE/LE   ------- ----    Lower c-spine/upper  t-spine stretch  (clasp hands lean fwd) 2x:30 2x:30 2x:30 --------    T-band lift/chop in standing B/L -------- Chop: purple  x20 each  Lift: GRN  x20 each Chop: double purple x20 ea  Lift: GRN single x20 Chop: double x20 each  Lift: Grn single x20    Fitter Board Balance ----------  D/C ----     Bosu mini squats hands on R knee 2x10 x15 with ball toss/catch x15 with ball toss/catch x15 with ball toss      Bosu step ups  R x20 Single leg step ups with pause SLS  B/L x10 Single leg step ups with pause SLS  B/L x15 Single pause  B/L x15    Standing T-band anti-rotation push/pull and rotate 11am-1pm Grn double band  x20 each GRN Band double  x15 each GRN Band Double  x15 ea GRN double  x15 each     Hoist chest press 5 plates  9C62 5 plates  E05 5 plates  5F02 5 plates  1V16     Hoist seated rows 5 plates  3D90 5 plates  5F64 5 plates  3V45 5 plates  9G89     hoist lat pull downs 5 plates  0N42 5 plates  7I66 5 plates  8K83 5 plates  4U78    Hoist R knee Ext 5 plates  5L34 ----------- 5 plates  2O38 5 plates  8F38    Hoist R knee Flex 5 plates  3E41 ---------- 5 plates  2H25 5 pates  2x15     Stand ground touches B/L x10 each X 10 each x10 ea x10 ea

## 2018-09-17 ENCOUNTER — APPOINTMENT (OUTPATIENT)
Dept: LAB | Facility: HOSPITAL | Age: 48
End: 2018-09-17
Attending: INTERNAL MEDICINE
Payer: COMMERCIAL

## 2018-09-17 ENCOUNTER — TRANSCRIBE ORDERS (OUTPATIENT)
Dept: ADMINISTRATIVE | Facility: HOSPITAL | Age: 48
End: 2018-09-17

## 2018-09-17 DIAGNOSIS — I10 ESSENTIAL HYPERTENSION: ICD-10-CM

## 2018-09-17 DIAGNOSIS — R35.89 POLYURIA: Primary | ICD-10-CM

## 2018-09-17 DIAGNOSIS — R35.89 POLYURIA: ICD-10-CM

## 2018-09-17 DIAGNOSIS — E78.5 HYPERLIPIDEMIA, UNSPECIFIED HYPERLIPIDEMIA TYPE: ICD-10-CM

## 2018-09-17 LAB
25(OH)D3 SERPL-MCNC: 43.4 NG/ML (ref 30–100)
ALBUMIN SERPL BCP-MCNC: 3.8 G/DL (ref 3.5–5.7)
ALP SERPL-CCNC: 35 U/L (ref 40–150)
ALT SERPL W P-5'-P-CCNC: 18 U/L (ref 7–52)
ANION GAP SERPL CALCULATED.3IONS-SCNC: 8 MMOL/L (ref 4–13)
AST SERPL W P-5'-P-CCNC: 17 U/L (ref 13–39)
BASOPHILS # BLD AUTO: 0 THOUSANDS/ΜL (ref 0–0.1)
BASOPHILS NFR BLD AUTO: 0 % (ref 0–1)
BILIRUB SERPL-MCNC: 0.6 MG/DL (ref 0.2–1)
BILIRUB UR QL STRIP: NEGATIVE
BUN SERPL-MCNC: 21 MG/DL (ref 7–25)
CALCIUM SERPL-MCNC: 9.6 MG/DL (ref 8.6–10.5)
CHLORIDE SERPL-SCNC: 104 MMOL/L (ref 98–107)
CHOLEST SERPL-MCNC: 125 MG/DL (ref 0–200)
CLARITY UR: CLEAR
CO2 SERPL-SCNC: 29 MMOL/L (ref 21–31)
COLOR UR: YELLOW
CREAT SERPL-MCNC: 1.16 MG/DL (ref 0.7–1.3)
EOSINOPHIL # BLD AUTO: 0.2 THOUSAND/ΜL (ref 0–0.61)
EOSINOPHIL NFR BLD AUTO: 3 % (ref 0–6)
ERYTHROCYTE [DISTWIDTH] IN BLOOD BY AUTOMATED COUNT: 14 % (ref 11.6–15.1)
EST. AVERAGE GLUCOSE BLD GHB EST-MCNC: 114 MG/DL
GFR SERPL CREATININE-BSD FRML MDRD: 74 ML/MIN/1.73SQ M
GLUCOSE P FAST SERPL-MCNC: 95 MG/DL (ref 65–99)
GLUCOSE UR STRIP-MCNC: NEGATIVE MG/DL
HBA1C MFR BLD: 5.6 % (ref 4.2–6.3)
HCT VFR BLD AUTO: 42.1 % (ref 42–52)
HDLC SERPL-MCNC: 39 MG/DL (ref 40–60)
HGB BLD-MCNC: 14.1 G/DL (ref 12–17)
HGB UR QL STRIP.AUTO: NEGATIVE
KETONES UR STRIP-MCNC: NEGATIVE MG/DL
LDLC SERPL CALC-MCNC: 57 MG/DL (ref 0–100)
LEUKOCYTE ESTERASE UR QL STRIP: NEGATIVE
LYMPHOCYTES # BLD AUTO: 2.1 THOUSANDS/ΜL (ref 0.6–4.47)
LYMPHOCYTES NFR BLD AUTO: 32 % (ref 14–44)
MCH RBC QN AUTO: 30.4 PG (ref 26.8–34.3)
MCHC RBC AUTO-ENTMCNC: 33.5 G/DL (ref 31.4–37.4)
MCV RBC AUTO: 91 FL (ref 82–98)
MONOCYTES # BLD AUTO: 0.6 THOUSAND/ΜL (ref 0.17–1.22)
MONOCYTES NFR BLD AUTO: 9 % (ref 4–12)
NEUTROPHILS # BLD AUTO: 3.7 THOUSANDS/ΜL (ref 1.85–7.62)
NEUTS SEG NFR BLD AUTO: 57 % (ref 43–75)
NITRITE UR QL STRIP: NEGATIVE
NONHDLC SERPL-MCNC: 86 MG/DL
NRBC BLD AUTO-RTO: 0 /100 WBCS
PH UR STRIP.AUTO: 6.5 [PH] (ref 5–8)
PLATELET # BLD AUTO: 213 THOUSANDS/UL (ref 149–390)
PMV BLD AUTO: 7.6 FL (ref 8.9–12.7)
POTASSIUM SERPL-SCNC: 4 MMOL/L (ref 3.5–5.5)
PROT SERPL-MCNC: 6.6 G/DL (ref 6.4–8.9)
PROT UR STRIP-MCNC: NEGATIVE MG/DL
RBC # BLD AUTO: 4.63 MILLION/UL (ref 3.88–5.62)
SODIUM SERPL-SCNC: 141 MMOL/L (ref 134–143)
SP GR UR STRIP.AUTO: 1.02 (ref 1–1.03)
T4 FREE SERPL-MCNC: 0.95 NG/DL (ref 0.76–1.46)
TRIGL SERPL-MCNC: 146 MG/DL (ref 44–166)
TSH SERPL DL<=0.05 MIU/L-ACNC: 1.8 UIU/ML (ref 0.45–5.33)
UROBILINOGEN UR QL STRIP.AUTO: 0.2 E.U./DL
VIT B12 SERPL-MCNC: 584 PG/ML (ref 100–900)
WBC # BLD AUTO: 6.6 THOUSAND/UL (ref 4.31–10.16)

## 2018-09-17 PROCEDURE — 84439 ASSAY OF FREE THYROXINE: CPT

## 2018-09-17 PROCEDURE — 36415 COLL VENOUS BLD VENIPUNCTURE: CPT

## 2018-09-17 PROCEDURE — 83036 HEMOGLOBIN GLYCOSYLATED A1C: CPT

## 2018-09-17 PROCEDURE — 82607 VITAMIN B-12: CPT

## 2018-09-17 PROCEDURE — 84443 ASSAY THYROID STIM HORMONE: CPT

## 2018-09-17 PROCEDURE — 82306 VITAMIN D 25 HYDROXY: CPT

## 2018-09-17 PROCEDURE — 81003 URINALYSIS AUTO W/O SCOPE: CPT

## 2018-09-17 PROCEDURE — 87086 URINE CULTURE/COLONY COUNT: CPT

## 2018-09-17 PROCEDURE — 85025 COMPLETE CBC W/AUTO DIFF WBC: CPT

## 2018-09-17 PROCEDURE — 80053 COMPREHEN METABOLIC PANEL: CPT

## 2018-09-17 PROCEDURE — 80061 LIPID PANEL: CPT

## 2018-09-18 ENCOUNTER — APPOINTMENT (OUTPATIENT)
Dept: PHYSICAL THERAPY | Facility: CLINIC | Age: 48
End: 2018-09-18
Payer: COMMERCIAL

## 2018-09-18 LAB — BACTERIA UR CULT: NORMAL

## 2018-09-19 ENCOUNTER — APPOINTMENT (OUTPATIENT)
Dept: PHYSICAL THERAPY | Facility: CLINIC | Age: 48
End: 2018-09-19
Payer: COMMERCIAL

## 2018-11-15 ENCOUNTER — TRANSCRIBE ORDERS (OUTPATIENT)
Dept: ADMINISTRATIVE | Facility: HOSPITAL | Age: 48
End: 2018-11-15

## 2018-11-15 DIAGNOSIS — M54.10 RADICULOPATHY, UNSPECIFIED SPINAL REGION: Primary | ICD-10-CM

## 2018-11-15 DIAGNOSIS — M54.17 LUMBOSACRAL NEURITIS: ICD-10-CM

## 2018-11-20 ENCOUNTER — HOSPITAL ENCOUNTER (OUTPATIENT)
Dept: MRI IMAGING | Facility: HOSPITAL | Age: 48
Discharge: HOME | End: 2018-11-20
Attending: ANESTHESIOLOGY
Payer: COMMERCIAL

## 2018-11-20 DIAGNOSIS — M54.17 LUMBOSACRAL NEURITIS: ICD-10-CM

## 2018-11-20 DIAGNOSIS — M54.10 RADICULOPATHY, UNSPECIFIED SPINAL REGION: ICD-10-CM

## 2018-11-20 PROCEDURE — 72148 MRI LUMBAR SPINE W/O DYE: CPT
